# Patient Record
Sex: FEMALE | Race: WHITE | NOT HISPANIC OR LATINO | Employment: STUDENT | URBAN - METROPOLITAN AREA
[De-identification: names, ages, dates, MRNs, and addresses within clinical notes are randomized per-mention and may not be internally consistent; named-entity substitution may affect disease eponyms.]

---

## 2018-06-13 ENCOUNTER — EH NON-PROVIDER (OUTPATIENT)
Dept: OCCUPATIONAL MEDICINE | Facility: CLINIC | Age: 23
End: 2018-06-13

## 2018-06-13 ENCOUNTER — EMPLOYEE HEALTH (OUTPATIENT)
Dept: OCCUPATIONAL MEDICINE | Facility: CLINIC | Age: 23
End: 2018-06-13

## 2018-06-13 ENCOUNTER — HOSPITAL ENCOUNTER (OUTPATIENT)
Facility: MEDICAL CENTER | Age: 23
End: 2018-06-13
Attending: PREVENTIVE MEDICINE
Payer: COMMERCIAL

## 2018-06-13 VITALS
HEART RATE: 84 BPM | TEMPERATURE: 98 F | HEIGHT: 66 IN | WEIGHT: 172 LBS | BODY MASS INDEX: 27.64 KG/M2 | OXYGEN SATURATION: 98 % | DIASTOLIC BLOOD PRESSURE: 78 MMHG | RESPIRATION RATE: 16 BRPM | SYSTOLIC BLOOD PRESSURE: 116 MMHG

## 2018-06-13 DIAGNOSIS — Z02.1 PHYSICAL EXAM, PRE-EMPLOYMENT: ICD-10-CM

## 2018-06-13 DIAGNOSIS — Z02.1 ENCOUNTER FOR PRE-EMPLOYMENT HEALTH SCREENING EXAMINATION: ICD-10-CM

## 2018-06-13 DIAGNOSIS — Z02.1 PRE-EMPLOYMENT DRUG SCREENING: ICD-10-CM

## 2018-06-13 LAB
AMP AMPHETAMINE: NORMAL
BAR BARBITURATES: NORMAL
BZO BENZODIAZEPINES: NORMAL
COC COCAINE: NORMAL
INT CON NEG: NORMAL
INT CON POS: NORMAL
MDMA ECSTASY: NORMAL
MET METHAMPHETAMINES: NORMAL
MTD METHADONE: NORMAL
OPI OPIATES: NORMAL
OXY OXYCODONE: NORMAL
PCP PHENCYCLIDINE: NORMAL
POC URINE DRUG SCREEN OCDRS: NORMAL
THC: NORMAL

## 2018-06-13 PROCEDURE — 86765 RUBEOLA ANTIBODY: CPT | Performed by: PREVENTIVE MEDICINE

## 2018-06-13 PROCEDURE — 8915 PR COMPREHENSIVE PHYSICAL: Performed by: PREVENTIVE MEDICINE

## 2018-06-13 PROCEDURE — 86735 MUMPS ANTIBODY: CPT | Performed by: PREVENTIVE MEDICINE

## 2018-06-13 PROCEDURE — 90636 HEP A/HEP B VACC ADULT IM: CPT | Performed by: PREVENTIVE MEDICINE

## 2018-06-13 PROCEDURE — 86762 RUBELLA ANTIBODY: CPT | Performed by: PREVENTIVE MEDICINE

## 2018-06-13 PROCEDURE — 80305 DRUG TEST PRSMV DIR OPT OBS: CPT | Performed by: PREVENTIVE MEDICINE

## 2018-06-13 PROCEDURE — 86787 VARICELLA-ZOSTER ANTIBODY: CPT | Performed by: PREVENTIVE MEDICINE

## 2018-06-13 PROCEDURE — 86480 TB TEST CELL IMMUN MEASURE: CPT | Performed by: PREVENTIVE MEDICINE

## 2018-06-13 ASSESSMENT — VISUAL ACUITY
OS_CC: 20/25
OD_CC: 20/25

## 2018-06-13 NOTE — PROGRESS NOTES
Pre-employment physical exam. See scanned documents    Patient's body mass index is 27.76 kg/m². Exercise and nutrition counseling were performed at this visit.

## 2018-06-14 LAB — RUBV AB SER QL: >500 IU/ML

## 2018-06-15 LAB
M TB TUBERC IFN-G BLD QL: NEGATIVE
M TB TUBERC IFN-G/MITOGEN IGNF BLD: 0.01
M TB TUBERC IGNF/MITOGEN IGNF CONTROL: 41.08 [IU]/ML
MEV IGG SER IA-ACNC: 3.54
MITOGEN IGNF BCKGRD COR BLD-ACNC: 0.02 [IU]/ML
MUV IGG SER IA-ACNC: 2.01
VZV IGG SER IA-ACNC: 2.46

## 2018-06-22 ENCOUNTER — DOCUMENTATION (OUTPATIENT)
Dept: OCCUPATIONAL MEDICINE | Facility: CLINIC | Age: 23
End: 2018-06-22

## 2018-07-16 ENCOUNTER — NON-PROVIDER VISIT (OUTPATIENT)
Dept: OCCUPATIONAL MEDICINE | Facility: CLINIC | Age: 23
End: 2018-07-16

## 2018-07-16 DIAGNOSIS — Z23 NEED FOR VACCINATION: ICD-10-CM

## 2018-07-16 PROCEDURE — 90636 HEP A/HEP B VACC ADULT IM: CPT | Performed by: PREVENTIVE MEDICINE

## 2018-08-24 ENCOUNTER — OFFICE VISIT (OUTPATIENT)
Dept: MEDICAL GROUP | Age: 23
End: 2018-08-24
Payer: COMMERCIAL

## 2018-08-24 VITALS
DIASTOLIC BLOOD PRESSURE: 60 MMHG | HEART RATE: 91 BPM | HEIGHT: 66 IN | OXYGEN SATURATION: 96 % | SYSTOLIC BLOOD PRESSURE: 98 MMHG | TEMPERATURE: 98 F | BODY MASS INDEX: 27.29 KG/M2 | WEIGHT: 169.8 LBS

## 2018-08-24 DIAGNOSIS — R53.83 FATIGUE, UNSPECIFIED TYPE: ICD-10-CM

## 2018-08-24 DIAGNOSIS — E55.9 VITAMIN D DEFICIENCY: ICD-10-CM

## 2018-08-24 DIAGNOSIS — F33.1 MODERATE EPISODE OF RECURRENT MAJOR DEPRESSIVE DISORDER (HCC): ICD-10-CM

## 2018-08-24 DIAGNOSIS — J45.40 MODERATE PERSISTENT ASTHMA WITHOUT COMPLICATION: ICD-10-CM

## 2018-08-24 PROCEDURE — 99204 OFFICE O/P NEW MOD 45 MIN: CPT | Performed by: PHYSICIAN ASSISTANT

## 2018-08-24 RX ORDER — ALPRAZOLAM 0.25 MG/1
0.25 TABLET ORAL NIGHTLY PRN
COMMUNITY

## 2018-08-24 RX ORDER — DULOXETIN HYDROCHLORIDE 60 MG/1
60 CAPSULE, DELAYED RELEASE ORAL DAILY
COMMUNITY
End: 2018-10-17 | Stop reason: SDUPTHER

## 2018-08-24 RX ORDER — TRAZODONE HYDROCHLORIDE 50 MG/1
50 TABLET ORAL NIGHTLY
COMMUNITY
End: 2018-09-26

## 2018-08-24 RX ORDER — NORGESTIMATE AND ETHINYL ESTRADIOL 7DAYSX3 28
1 KIT ORAL DAILY
COMMUNITY
End: 2018-10-17 | Stop reason: SDUPTHER

## 2018-08-24 RX ORDER — MONTELUKAST SODIUM 10 MG/1
10 TABLET ORAL DAILY
COMMUNITY
End: 2018-10-17 | Stop reason: SDUPTHER

## 2018-08-24 ASSESSMENT — PATIENT HEALTH QUESTIONNAIRE - PHQ9
SUM OF ALL RESPONSES TO PHQ QUESTIONS 1-9: 14
5. POOR APPETITE OR OVEREATING: 0 - NOT AT ALL
CLINICAL INTERPRETATION OF PHQ2 SCORE: 2

## 2018-08-24 NOTE — LETTER
[facility]  Laura Hill P.A.-C.  [address] [place] [address]  Fax: [phone]   Authorization for Release/Disclosure of   Protected Health Information   Name: IMELDA GERMAIN : 1995 SSN: [id number]   Address: [id number] [place] [address]  [place] [address] Phone:    [phone] (home)    I authorize the entity listed below to release/disclose the PHI below to:   [facility]/Laura Hill P.A.-C. and Laura Hill P.A.-C.   Provider or  Dr. Tae Granger -  Psychology   Address   City, [facility], [facility]   Phone:      Fax:     Reason for request: continuity of care   Information to be released:    [  ] LAST COLONOSCOPY,  including any PATH REPORT and follow-up  [  ] LAST FIT/COLOGUARD RESULT [  ] LAST DEXA  [  ] LAST MAMMOGRAM  [  ] LAST PAP  [  ] LAST LABS [  ] RETINA EXAM REPORT  [  ] IMMUNIZATION RECORDS  [x ] Release all info      [  ] Check here and initial the line next to each item to release ALL health information INCLUDING  _____ Care and treatment for drug and / or alcohol abuse  _____ HIV testing, infection status, or AIDS  _____ Genetic Testing    DATES OF SERVICE OR TIME PERIOD TO BE DISCLOSED: _____________  I understand and acknowledge that:  * This Authorization may be revoked at any time by you in writing, except if your health information has already been used or disclosed.  * Your health information that will be used or disclosed as a result of you signing this authorization could be re-disclosed by the recipient. If this occurs, your re-disclosed health information may no longer be protected by State or Federal laws.  * You may refuse to sign this Authorization. Your refusal will not affect your ability to obtain treatment.  * This Authorization becomes effective upon signing and will  on (date) __________.      If no date is indicated, this Authorization will  one (1) year from the signature date.    Name: Imelda Germain    Signature:   Date:     2018            PLEASE FAX REQUESTED RECORDS BACK TO: [phone]

## 2018-08-24 NOTE — LETTER
Pending sale to [facility]  Laura Hill P.A.-C.  [address] [place] [address]  Fax: [phone]   Authorization for Release/Disclosure of   Protected Health Information   Name: IMELDA GERMAIN : 1995 SSN: [id number]   Address: [id number] [place] [address]  [place] [address] Phone:    [phone] (home)    I authorize the entity listed below to release/disclose the PHI below to:   Pending sale to [facility]/Laura Hill P.A.-C. and Laura Hill P.A.-C.   Provider or Entity Name:  Dr. Eulalia Ramsey - MELIZAR   Address   City, [facility], [facility]   Phone:      Fax:     Reason for request: continuity of care   Information to be released:    [  ] LAST COLONOSCOPY,  including any PATH REPORT and follow-up  [  ] LAST FIT/COLOGUARD RESULT [  ] LAST DEXA  [  ] LAST MAMMOGRAM  [  ] LAST PAP  [  ] LAST LABS [  ] RETINA EXAM REPORT  [  ] IMMUNIZATION RECORDS  [x ] Release all info      [  ] Check here and initial the line next to each item to release ALL health information INCLUDING  _____ Care and treatment for drug and / or alcohol abuse  _____ HIV testing, infection status, or AIDS  _____ Genetic Testing    DATES OF SERVICE OR TIME PERIOD TO BE DISCLOSED: _____________  I understand and acknowledge that:  * This Authorization may be revoked at any time by you in writing, except if your health information has already been used or disclosed.  * Your health information that will be used or disclosed as a result of you signing this authorization could be re-disclosed by the recipient. If this occurs, your re-disclosed health information may no longer be protected by State or Federal laws.  * You may refuse to sign this Authorization. Your refusal will not affect your ability to obtain treatment.  * This Authorization becomes effective upon signing and will  on (date) __________.      If no date is indicated, this Authorization will  one (1) year from the signature date.    Name: Imelda Germain    Signature:   Date:     2018            PLEASE FAX REQUESTED RECORDS BACK TO: [phone]

## 2018-08-24 NOTE — ASSESSMENT & PLAN NOTE
"This is a chronic problem. The patient reports that she is currently taking Cymbalta 60mg daily. She has been on this for  2.5 years. Hasn't had a lot of success with others - has tried Prozac which did not work, Wellbutrin affected her memory, Effexor caused \"zingers\" in her brain and another SSRI that did not treat her depression. She also uses Trazodone nightly as needed, but rarely takes it because it leaves her feeling groggy the next morning. She tells me that she typically uses benadryl due to quick onset and no grogginess in the morning. Xanax as needed (approximatley once a month for obsessive recurrent thoughts, does not typically have panic). This typically happens at night, which causes some insomnia and the Xanax helps put her to sleep.     Psychiatrist was Dr. Marquez who only works inpatient now. She would like a referral for a new psychiatrist.     PHQ-9 is 14 today, indicating poor control of her symptoms with the current regimen. We will check some labs and have her return in 1 month for follow up and discussion about medication adjustments. No current SI/HI or thoughts of self harm. Currently struggling with fatigue and difficulty concentrating.    "
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
## 2018-08-24 NOTE — LETTER
[facility]  Laura Hill P.A.-C.  [address] [place] [address]  Fax: [phone]   Authorization for Release/Disclosure of   Protected Health Information   Name: MICHELE JONES : 1995 SSN: [id number]   Address: [address] [place] [address]  [place] [address] Phone:    [phone] (home)    I authorize the entity listed below to release/disclose the PHI below to:   [facility]/Larua Hill P.A.-C. and Laura Hill P.A.-C.   Provider or Entity Name:  Dr. Rodriguez [facility]   Address   City, [facility] [place], [place] Phone:      Fax:     Reason for request: continuity of care   Information to be released:    [  ] LAST COLONOSCOPY,  including any PATH REPORT and follow-up  [  ] LAST FIT/COLOGUARD RESULT [  ] LAST DEXA  [  ] LAST MAMMOGRAM  [  ] LAST PAP  [  ] LAST LABS [  ] RETINA EXAM REPORT  [  ] IMMUNIZATION RECORDS  [x ] Release all info      [  ] Check here and initial the line next to each item to release ALL health information INCLUDING  _____ Care and treatment for drug and / or alcohol abuse  _____ HIV testing, infection status, or AIDS  _____ Genetic Testing    DATES OF SERVICE OR TIME PERIOD TO BE DISCLOSED: _____________  I understand and acknowledge that:  * This Authorization may be revoked at any time by you in writing, except if your health information has already been used or disclosed.  * Your health information that will be used or disclosed as a result of you signing this authorization could be re-disclosed by the recipient. If this occurs, your re-disclosed health information may no longer be protected by State or Federal laws.  * You may refuse to sign this Authorization. Your refusal will not affect your ability to obtain treatment.  * This Authorization becomes effective upon signing and will  on (date) __________.      If no date is indicated, this Authorization will  one (1) year from the signature date.    Name: Michele CABRALES  Jessa    Signature:   Date:     8/24/2018       PLEASE FAX REQUESTED RECORDS BACK TO: [phone]

## 2018-08-27 RX ORDER — BUDESONIDE AND FORMOTEROL FUMARATE DIHYDRATE 160; 4.5 UG/1; UG/1
AEROSOL RESPIRATORY (INHALATION)
Refills: 2 | COMMUNITY
Start: 2018-06-27 | End: 2018-10-17 | Stop reason: SDUPTHER
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
## 2018-09-12 ENCOUNTER — HOSPITAL ENCOUNTER (OUTPATIENT)
Dept: LAB | Facility: MEDICAL CENTER | Age: 23
End: 2018-09-12
Payer: COMMERCIAL

## 2018-09-12 ENCOUNTER — HOSPITAL ENCOUNTER (OUTPATIENT)
Dept: LAB | Facility: MEDICAL CENTER | Age: 23
End: 2018-09-12
Attending: PHYSICIAN ASSISTANT
Payer: COMMERCIAL

## 2018-09-12 DIAGNOSIS — R53.83 FATIGUE, UNSPECIFIED TYPE: ICD-10-CM

## 2018-09-12 DIAGNOSIS — E55.9 VITAMIN D DEFICIENCY: ICD-10-CM

## 2018-09-12 LAB
25(OH)D3 SERPL-MCNC: 31 NG/ML (ref 30–100)
ALBUMIN SERPL BCP-MCNC: 4.4 G/DL (ref 3.2–4.9)
ALBUMIN/GLOB SERPL: 1.5 G/DL
ALP SERPL-CCNC: 41 U/L (ref 30–99)
ALT SERPL-CCNC: 12 U/L (ref 2–50)
ANION GAP SERPL CALC-SCNC: 10 MMOL/L (ref 0–11.9)
AST SERPL-CCNC: 16 U/L (ref 12–45)
BASOPHILS # BLD AUTO: 0.8 % (ref 0–1.8)
BASOPHILS # BLD: 0.04 K/UL (ref 0–0.12)
BDY FAT % MEASURED: 30.5 %
BILIRUB SERPL-MCNC: 0.6 MG/DL (ref 0.1–1.5)
BP DIAS: 67 MMHG
BP SYS: 112 MMHG
BUN SERPL-MCNC: 13 MG/DL (ref 8–22)
CALCIUM SERPL-MCNC: 9.6 MG/DL (ref 8.5–10.5)
CHLORIDE SERPL-SCNC: 106 MMOL/L (ref 96–112)
CHOLEST SERPL-MCNC: 203 MG/DL (ref 100–199)
CO2 SERPL-SCNC: 24 MMOL/L (ref 20–33)
CREAT SERPL-MCNC: 0.8 MG/DL (ref 0.5–1.4)
DIABETES HTDIA: NO
EOSINOPHIL # BLD AUTO: 0.55 K/UL (ref 0–0.51)
EOSINOPHIL NFR BLD: 11.1 % (ref 0–6.9)
ERYTHROCYTE [DISTWIDTH] IN BLOOD BY AUTOMATED COUNT: 36.2 FL (ref 35.9–50)
EVENT NAME HTEVT: NORMAL
FASTING HTFAS: YES
FASTING STATUS PATIENT QL REPORTED: NORMAL
FASTING STATUS PATIENT QL REPORTED: NORMAL
GLOBULIN SER CALC-MCNC: 3 G/DL (ref 1.9–3.5)
GLUCOSE SERPL-MCNC: 70 MG/DL (ref 65–99)
GLUCOSE SERPL-MCNC: 71 MG/DL (ref 65–99)
HCT VFR BLD AUTO: 45.4 % (ref 37–47)
HDLC SERPL-MCNC: 64 MG/DL
HGB BLD-MCNC: 15.2 G/DL (ref 12–16)
HYPERTENSION HTHYP: NO
IMM GRANULOCYTES # BLD AUTO: 0.01 K/UL (ref 0–0.11)
IMM GRANULOCYTES NFR BLD AUTO: 0.2 % (ref 0–0.9)
LDLC SERPL CALC-MCNC: 127 MG/DL
LYMPHOCYTES # BLD AUTO: 1.45 K/UL (ref 1–4.8)
LYMPHOCYTES NFR BLD: 29.2 % (ref 22–41)
MCH RBC QN AUTO: 27.3 PG (ref 27–33)
MCHC RBC AUTO-ENTMCNC: 33.5 G/DL (ref 33.6–35)
MCV RBC AUTO: 81.7 FL (ref 81.4–97.8)
MONOCYTES # BLD AUTO: 0.27 K/UL (ref 0–0.85)
MONOCYTES NFR BLD AUTO: 5.4 % (ref 0–13.4)
NEUTROPHILS # BLD AUTO: 2.65 K/UL (ref 2–7.15)
NEUTROPHILS NFR BLD: 53.3 % (ref 44–72)
NRBC # BLD AUTO: 0 K/UL
NRBC BLD-RTO: 0 /100 WBC
PLATELET # BLD AUTO: 244 K/UL (ref 164–446)
PMV BLD AUTO: 11.2 FL (ref 9–12.9)
POTASSIUM SERPL-SCNC: 4.3 MMOL/L (ref 3.6–5.5)
PROT SERPL-MCNC: 7.4 G/DL (ref 6–8.2)
RBC # BLD AUTO: 5.56 M/UL (ref 4.2–5.4)
SCREENING LOC CITY HTCIT: NORMAL
SCREENING LOC STATE HTSTA: NORMAL
SCREENING LOCATION HTLOC: NORMAL
SMOKING HTSMO: NO
SODIUM SERPL-SCNC: 140 MMOL/L (ref 135–145)
SUBSCRIBER ID HTSID: NORMAL
TRIGL SERPL-MCNC: 59 MG/DL (ref 0–149)
TSH SERPL DL<=0.005 MIU/L-ACNC: 1.81 UIU/ML (ref 0.38–5.33)
VIT B12 SERPL-MCNC: 441 PG/ML (ref 211–911)
WBC # BLD AUTO: 5 K/UL (ref 4.8–10.8)

## 2018-09-12 PROCEDURE — 82947 ASSAY GLUCOSE BLOOD QUANT: CPT

## 2018-09-12 PROCEDURE — 36415 COLL VENOUS BLD VENIPUNCTURE: CPT

## 2018-09-12 PROCEDURE — 80061 LIPID PANEL: CPT

## 2018-09-12 PROCEDURE — S5190 WELLNESS ASSESSMENT BY NONPH: HCPCS

## 2018-09-24 PROCEDURE — 90471 IMMUNIZATION ADMIN: CPT | Performed by: PREVENTIVE MEDICINE

## 2018-09-24 PROCEDURE — 90686 IIV4 VACC NO PRSV 0.5 ML IM: CPT | Performed by: PREVENTIVE MEDICINE

## 2018-09-26 ENCOUNTER — HOSPITAL ENCOUNTER (OUTPATIENT)
Facility: MEDICAL CENTER | Age: 23
End: 2018-09-26
Attending: PHYSICIAN ASSISTANT
Payer: COMMERCIAL

## 2018-09-26 ENCOUNTER — OFFICE VISIT (OUTPATIENT)
Dept: MEDICAL GROUP | Age: 23
End: 2018-09-26
Payer: COMMERCIAL

## 2018-09-26 VITALS
WEIGHT: 170.4 LBS | BODY MASS INDEX: 27.38 KG/M2 | HEIGHT: 66 IN | SYSTOLIC BLOOD PRESSURE: 110 MMHG | DIASTOLIC BLOOD PRESSURE: 62 MMHG | HEART RATE: 86 BPM | OXYGEN SATURATION: 97 % | TEMPERATURE: 97.2 F

## 2018-09-26 DIAGNOSIS — F33.1 MODERATE EPISODE OF RECURRENT MAJOR DEPRESSIVE DISORDER (HCC): ICD-10-CM

## 2018-09-26 DIAGNOSIS — Z01.419 WELL FEMALE EXAM WITH ROUTINE GYNECOLOGICAL EXAM: ICD-10-CM

## 2018-09-26 DIAGNOSIS — Z12.4 PAP SMEAR FOR CERVICAL CANCER SCREENING: ICD-10-CM

## 2018-09-26 PROCEDURE — 87491 CHLMYD TRACH DNA AMP PROBE: CPT

## 2018-09-26 PROCEDURE — 88175 CYTOPATH C/V AUTO FLUID REDO: CPT

## 2018-09-26 PROCEDURE — 87591 N.GONORRHOEAE DNA AMP PROB: CPT

## 2018-09-26 PROCEDURE — 99395 PREV VISIT EST AGE 18-39: CPT | Performed by: PHYSICIAN ASSISTANT

## 2018-09-26 PROCEDURE — 99000 SPECIMEN HANDLING OFFICE-LAB: CPT | Performed by: PHYSICIAN ASSISTANT

## 2018-09-26 RX ORDER — BUPROPION HYDROCHLORIDE 75 MG/1
75 TABLET ORAL DAILY
Qty: 30 TAB | Refills: 0 | Status: SHIPPED | OUTPATIENT
Start: 2018-09-26 | End: 2018-10-17 | Stop reason: SDUPTHER

## 2018-09-26 NOTE — PROGRESS NOTES
"S: Imelda Germain is a 22 y.o.,female who presents today for her Pap and GYN exam. Her LMP was 2018. She is still having regular menses. Her form of contraception is oral contraceptives    Moderate episode of recurrent major depressive disorder (HCC)  This is an ongoing problem.  Since our initial visit, the patient tells me that her symptoms are about the same.  At her last visit her PHQ was 14 and her chief complaint has been fatigue.  We did some routine labs and her vitamin D, B12, and thyroid tests were all normal.  Based on this, she is interested in making some adjustments to her depression medication, she feels that this is likely the cause of her symptoms.  She denies any suicidal or homicidal ideation today.  In the past, she has worked with a psychiatrist through [facility] but has not been able to see him as he moved to inpatient care only.  I did refer her to our psychiatry and behavioral health group, but she has not yet been able to get scheduled with them.    In the past, she has tried many medications which have not worked well for her.  She did use Wellbutrin in the past with some good response with her depression.  However, she tells me that she had a \"mental fog\" which made it difficult for her to focus on this medication.  She is unsure of the dose, but thinks it was maybe 150 mg daily.  We discussed several options including increasing her Cymbalta to 90 mg daily, or adding a low dose of Wellbutrin.  She is interested in adding a low-dose of Wellbutrin to see if we can augment her control.      We discussed the signs and symptoms of serotonin syndrome, and she will watch for these.  However, she is no longer taking trazodone to help with sleep so this will decrease her risk significantly.  I also provided her with the contact information for behavioral health department so she may call to schedule her counseling and psychiatry appointments.  We will recheck in 3 weeks.      She is , " "P:0.    She has not had an Abnormal Pap previously. This is her first Pap.   Her last Mammogram was done: due at 39 yo.     Her preventative health screens are up to date.  GYN ROS:  normal menses, no abnormal bleeding, pelvic pain or discharge, no breast pain or new or enlarging lumps on self exam    Patient Active Problem List    Diagnosis Date Noted   • Moderate episode of recurrent major depressive disorder (HCC) 08/24/2018   • Moderate persistent asthma without complication 08/24/2018   • Bronchospasm 05/16/2011     Current Outpatient Prescriptions on File Prior to Visit   Medication Sig Dispense Refill   • SYMBICORT 160-4.5 MCG/ACT Aerosol INL 1 PUFF PO BID  2   • montelukast (SINGULAIR) 10 MG Tab Take 10 mg by mouth every day.     • Cetirizine HCl (ZYRTEC ALLERGY) 10 MG Cap Take  by mouth.     • Fluticasone Propionate (FLONASE NA) Spray  in nose.     • DiphenhydrAMINE HCl (BENADRYL ALLERGY PO) Take  by mouth.     • Norgestim-Eth Estrad Triphasic (TRINESSA, 28,) 0.18/0.215/0.25 MG-35 MCG Tab Take 1 Tab by mouth every day.     • ALPRAZolam (XANAX) 0.25 MG Tab Take 0.25 mg by mouth at bedtime as needed for Sleep.     • DULoxetine (CYMBALTA) 60 MG Cap DR Particles delayed-release capsule Take 60 mg by mouth every day.     • albuterol (PROAIR HFA) 108 (90 BASE) MCG/ACT AERS Inhale 2 Puffs by mouth every 6 hours as needed for Shortness of Breath. 1 Inhaler 3   • L-Lysine 500 MG CAPS Take 1 Cap by mouth 2 Times a Day. 30 Each 0     No current facility-administered medications on file prior to visit.      Social History   Substance Use Topics   • Smoking status: Never Smoker   • Smokeless tobacco: Never Used   • Alcohol use No       O: /62 (BP Location: Left arm, Patient Position: Sitting, BP Cuff Size: Adult)   Pulse 86   Temp 36.2 °C (97.2 °F) (Temporal)   Ht 1.676 m (5' 6\")   Wt 77.3 kg (170 lb 6.4 oz)   LMP 09/15/2018   SpO2 97%   BMI 27.50 kg/m²   Vitals Noted and Reviewed  Vulva: grossly " unremarkable, no lesions or masses noted  Vagina: no abnormal discharge, normal color  Cervix: Parous, nonfriable, no surface lesions identified, Pap was performed  Uterus: Normal shape, position and consistency, Retroverted, mobile  Bimanual exam: No uteromegaly, negative chandelier sign, adnexa freely movable and without enlargements bilaterally  Rectal: not performed    Assessment:  Normal GYN Exam      Plan:   pap smear  return annually or prn    Pap processed and sent to the lab.    Recommend follow up in 3 weeks for follow up on medications.

## 2018-09-26 NOTE — ASSESSMENT & PLAN NOTE
"This is an ongoing problem.  Since our initial visit, the patient tells me that her symptoms are about the same.  At her last visit her PHQ was 14 and her chief complaint has been fatigue.  We did some routine labs and her vitamin D, B12, and thyroid tests were all normal.  Based on this, she is interested in making some adjustments to her depression medication, she feels that this is likely the cause of her symptoms.  She denies any suicidal or homicidal ideation today.  In the past, she has worked with a psychiatrist through [facility] but has not been able to see him as he moved to inpatient care only.  I did refer her to our psychiatry and behavioral health group, but she has not yet been able to get scheduled with them.    In the past, she has tried many medications which have not worked well for her.  She did use Wellbutrin in the past with some good response with her depression.  However, she tells me that she had a \"mental fog\" which made it difficult for her to focus on this medication.  She is unsure of the dose, but thinks it was maybe 150 mg daily.  We discussed several options including increasing her Cymbalta to 90 mg daily, or adding a low dose of Wellbutrin.  She is interested in adding a low-dose of Wellbutrin to see if we can augment her control.      We discussed the signs and symptoms of serotonin syndrome, and she will watch for these.  However, she is no longer taking trazodone to help with sleep so this will decrease her risk significantly.  I also provided her with the contact information for behavioral health department so she may call to schedule her counseling and psychiatry appointments.  We will recheck in 3 weeks.  "

## 2018-09-27 DIAGNOSIS — Z01.419 WELL FEMALE EXAM WITH ROUTINE GYNECOLOGICAL EXAM: ICD-10-CM

## 2018-09-27 DIAGNOSIS — Z12.4 PAP SMEAR FOR CERVICAL CANCER SCREENING: ICD-10-CM

## 2018-09-27 LAB — CYTOLOGY REG CYTOL: NORMAL

## 2018-09-28 LAB
C TRACH DNA GENITAL QL NAA+PROBE: NEGATIVE
N GONORRHOEA DNA GENITAL QL NAA+PROBE: NEGATIVE
SPECIMEN SOURCE: NORMAL

## 2018-10-01 ENCOUNTER — IMMUNIZATION (OUTPATIENT)
Dept: OCCUPATIONAL MEDICINE | Facility: CLINIC | Age: 23
End: 2018-10-01
Payer: COMMERCIAL

## 2018-10-01 DIAGNOSIS — B37.31 VAGINAL CANDIDA: ICD-10-CM

## 2018-10-01 DIAGNOSIS — Z23 NEED FOR VACCINATION: ICD-10-CM

## 2018-10-01 RX ORDER — FLUCONAZOLE 150 MG/1
150 TABLET ORAL ONCE
Qty: 2 TAB | Refills: 0 | Status: SHIPPED | OUTPATIENT
Start: 2018-10-01 | End: 2018-10-01

## 2018-10-17 ENCOUNTER — OFFICE VISIT (OUTPATIENT)
Dept: MEDICAL GROUP | Age: 23
End: 2018-10-17
Payer: COMMERCIAL

## 2018-10-17 VITALS
BODY MASS INDEX: 27.26 KG/M2 | SYSTOLIC BLOOD PRESSURE: 90 MMHG | WEIGHT: 169.6 LBS | TEMPERATURE: 97.4 F | OXYGEN SATURATION: 98 % | HEIGHT: 66 IN | HEART RATE: 71 BPM | DIASTOLIC BLOOD PRESSURE: 64 MMHG

## 2018-10-17 DIAGNOSIS — F33.1 MODERATE EPISODE OF RECURRENT MAJOR DEPRESSIVE DISORDER (HCC): ICD-10-CM

## 2018-10-17 DIAGNOSIS — Z23 NEED FOR VACCINATION: ICD-10-CM

## 2018-10-17 DIAGNOSIS — Z30.41 ENCOUNTER FOR SURVEILLANCE OF CONTRACEPTIVE PILLS: ICD-10-CM

## 2018-10-17 DIAGNOSIS — J45.40 MODERATE PERSISTENT ASTHMA WITHOUT COMPLICATION: ICD-10-CM

## 2018-10-17 PROCEDURE — 90472 IMMUNIZATION ADMIN EACH ADD: CPT | Performed by: PHYSICIAN ASSISTANT

## 2018-10-17 PROCEDURE — 90732 PPSV23 VACC 2 YRS+ SUBQ/IM: CPT | Performed by: PHYSICIAN ASSISTANT

## 2018-10-17 PROCEDURE — 90471 IMMUNIZATION ADMIN: CPT | Performed by: PHYSICIAN ASSISTANT

## 2018-10-17 PROCEDURE — 90651 9VHPV VACCINE 2/3 DOSE IM: CPT | Performed by: PHYSICIAN ASSISTANT

## 2018-10-17 PROCEDURE — 99213 OFFICE O/P EST LOW 20 MIN: CPT | Mod: 25 | Performed by: PHYSICIAN ASSISTANT

## 2018-10-17 RX ORDER — MONTELUKAST SODIUM 10 MG/1
10 TABLET ORAL DAILY
Qty: 90 TAB | Refills: 3 | Status: SHIPPED | OUTPATIENT
Start: 2018-10-17 | End: 2019-07-24 | Stop reason: SDUPTHER

## 2018-10-17 RX ORDER — ALBUTEROL SULFATE 90 UG/1
2 AEROSOL, METERED RESPIRATORY (INHALATION) EVERY 6 HOURS PRN
Qty: 1 INHALER | Refills: 3 | Status: SHIPPED | OUTPATIENT
Start: 2018-10-17

## 2018-10-17 RX ORDER — DULOXETIN HYDROCHLORIDE 60 MG/1
60 CAPSULE, DELAYED RELEASE ORAL DAILY
Qty: 90 CAP | Refills: 3 | Status: SHIPPED | OUTPATIENT
Start: 2018-10-17 | End: 2019-07-24

## 2018-10-17 RX ORDER — BUDESONIDE AND FORMOTEROL FUMARATE DIHYDRATE 160; 4.5 UG/1; UG/1
1 AEROSOL RESPIRATORY (INHALATION) 2 TIMES DAILY
Qty: 3 INHALER | Refills: 3 | Status: SHIPPED | OUTPATIENT
Start: 2018-10-17 | End: 2019-07-24 | Stop reason: SDUPTHER

## 2018-10-17 RX ORDER — BUPROPION HYDROCHLORIDE 75 MG/1
75 TABLET ORAL DAILY
Qty: 90 TAB | Refills: 3 | Status: SHIPPED | OUTPATIENT
Start: 2018-10-17 | End: 2019-07-24

## 2018-10-17 RX ORDER — NORGESTIMATE AND ETHINYL ESTRADIOL 7DAYSX3 28
1 KIT ORAL DAILY
Qty: 84 TAB | Refills: 3 | Status: SHIPPED | OUTPATIENT
Start: 2018-10-17 | End: 2019-03-04

## 2018-10-17 NOTE — ASSESSMENT & PLAN NOTE
"Ongoing problem. She reports improvement in her symptoms with the addition of the Wellbutrin and without the \"mind fog\" that she previously had with this medication at higher doses, or any other side effects. She would like to continue with this combination.     Improvement in her motivation and overall mood. No suicidal or homicidal ideation.   "

## 2018-10-17 NOTE — PROGRESS NOTES
"CC: depression medication follow up.    History of Present Illness: This is a 22 y.o. female established patient who presents today for follow up after recent medication change.     Moderate episode of recurrent major depressive disorder (HCC)  Ongoing problem. She reports improvement in her symptoms with the addition of the Wellbutrin and without the \"mind fog\" that she previously had with this medication at higher doses, or any other side effects. She would like to continue with this combination.     Improvement in her motivation and overall mood. No suicidal or homicidal ideation.       Patient Active Problem List    Diagnosis Date Noted   • Moderate episode of recurrent major depressive disorder (HCC) 08/24/2018   • Moderate persistent asthma without complication 08/24/2018      Allergies:Amoxicillin and Erythrocin    Current Outpatient Prescriptions   Medication Sig Dispense Refill   • buPROPion (WELLBUTRIN) 75 MG Tab Take 1 Tab by mouth every day. 90 Tab 3   • SYMBICORT 160-4.5 MCG/ACT Aerosol Inhale 1 Puff by mouth 2 Times a Day. 3 Inhaler 3   • montelukast (SINGULAIR) 10 MG Tab Take 1 Tab by mouth every day. 90 Tab 3   • Norgestim-Eth Estrad Triphasic (TRINESSA, 28,) 0.18/0.215/0.25 MG-35 MCG Tab Take 1 Tab by mouth every day. 84 Tab 3   • DULoxetine (CYMBALTA) 60 MG Cap DR Particles delayed-release capsule Take 1 Cap by mouth every day. 90 Cap 3   • albuterol (PROAIR HFA) 108 (90 Base) MCG/ACT Aero Soln inhalation aerosol Inhale 2 Puffs by mouth every 6 hours as needed for Shortness of Breath. 1 Inhaler 3   • Cetirizine HCl (ZYRTEC ALLERGY) 10 MG Cap Take  by mouth.     • Fluticasone Propionate (FLONASE NA) Spray  in nose.     • DiphenhydrAMINE HCl (BENADRYL ALLERGY PO) Take  by mouth.     • ALPRAZolam (XANAX) 0.25 MG Tab Take 0.25 mg by mouth at bedtime as needed for Sleep.     • L-Lysine 500 MG CAPS Take 1 Cap by mouth 2 Times a Day. 30 Each 0     No current facility-administered medications for this " "visit.        Social History   Substance Use Topics   • Smoking status: Never Smoker   • Smokeless tobacco: Never Used   • Alcohol use No     Social History     Social History Narrative   • No narrative on file       No family history on file.    Review of Systems:    Constitutional: Negative for fever, chills.   Gastrointestinal: Negative for abdominal pain, nausea or vomiting.  Musculoskeletal: Negative for myalgias, back pain, and joint pain.   Skin: Negative for rash, itching, cyanotic skin color change.   Neurological: Negative for dizziness, tingling, tremors, focal sensory deficit, focal weakness and headaches.   Psychiatric/Behavioral: Negative for depression, suicidal/homicidal ideation and memory loss.            Exam:    Blood pressure (!) 90/64, pulse 71, temperature 36.3 °C (97.4 °F), temperature source Temporal, height 1.676 m (5' 6\"), weight 76.9 kg (169 lb 9.6 oz), SpO2 98 %, not currently breastfeeding. Body mass index is 27.37 kg/m².    General: Normal appearing. No distress.  Eyes: Conjunctiva clear, lids without ptosis, pupils equal, round and reactive to light  Musculoskeletal: Normal gait. No extremity cyanosis, clubbing, or edema.  Neurologic: Grossly non-focal, DTRs intact.  Skin: Warm and dry.  No obvious lesions.  Psych: Normal mood and affect. Alert and oriented x3. Judgment and insight is normal.      Health Maintenance: Completed    Assessment/Plan:  1. Moderate episode of recurrent major depressive disorder (HCC)  Controlled with current medications and lifestyle measures. No changes are indicated at this time.   - buPROPion (WELLBUTRIN) 75 MG Tab; Take 1 Tab by mouth every day.  Dispense: 90 Tab; Refill: 3  - DULoxetine (CYMBALTA) 60 MG Cap DR Particles delayed-release capsule; Take 1 Cap by mouth every day.  Dispense: 90 Cap; Refill: 3    2. Moderate persistent asthma without complication  Controlled with current medications and lifestyle measures. No changes are indicated at this " time.   - SYMBICORT 160-4.5 MCG/ACT Aerosol; Inhale 1 Puff by mouth 2 Times a Day.  Dispense: 3 Inhaler; Refill: 3  - montelukast (SINGULAIR) 10 MG Tab; Take 1 Tab by mouth every day.  Dispense: 90 Tab; Refill: 3  - albuterol (PROAIR HFA) 108 (90 Base) MCG/ACT Aero Soln inhalation aerosol; Inhale 2 Puffs by mouth every 6 hours as needed for Shortness of Breath.  Dispense: 1 Inhaler; Refill: 3  - PneumoVax PPV23 =>1yo    3. Encounter for surveillance of contraceptive pills  - Norgestim-Eth Estrad Triphasic (TRINESSA, 28,) 0.18/0.215/0.25 MG-35 MCG Tab; Take 1 Tab by mouth every day.  Dispense: 84 Tab; Refill: 3    4. Need for vaccination  - Gardasil 9      Return in about 2 months (around 12/17/2018) for MA repeat Gardasil.    Patient was in agreement with this treatment plan and seemed to understand without barriers. All questions were encouraged and answered. Reviewed signs and symptoms of when to seek emergency medical care.     Please note that this dictation was created using voice recognition software. I have made every reasonable attempt to correct obvious errors, but I expect that there may be errors of grammar and possibly content that I did not discover before finalizing the note.

## 2018-10-22 ENCOUNTER — OFFICE VISIT (OUTPATIENT)
Dept: MEDICAL GROUP | Age: 23
End: 2018-10-22
Payer: COMMERCIAL

## 2018-10-22 VITALS
WEIGHT: 169 LBS | DIASTOLIC BLOOD PRESSURE: 66 MMHG | OXYGEN SATURATION: 96 % | BODY MASS INDEX: 27.16 KG/M2 | HEIGHT: 66 IN | HEART RATE: 84 BPM | TEMPERATURE: 97.6 F | SYSTOLIC BLOOD PRESSURE: 112 MMHG

## 2018-10-22 DIAGNOSIS — J45.40 MODERATE PERSISTENT ASTHMA WITHOUT COMPLICATION: ICD-10-CM

## 2018-10-22 DIAGNOSIS — R09.89 CHEST CONGESTION: ICD-10-CM

## 2018-10-22 PROCEDURE — 99214 OFFICE O/P EST MOD 30 MIN: CPT | Performed by: PHYSICIAN ASSISTANT

## 2018-10-22 RX ORDER — IPRATROPIUM BROMIDE AND ALBUTEROL SULFATE 2.5; .5 MG/3ML; MG/3ML
3 SOLUTION RESPIRATORY (INHALATION) ONCE
Status: COMPLETED | OUTPATIENT
Start: 2018-10-22 | End: 2018-10-22

## 2018-10-22 RX ORDER — ALBUTEROL SULFATE 2.5 MG/3ML
2.5 SOLUTION RESPIRATORY (INHALATION) EVERY 4 HOURS PRN
Qty: 30 BULLET | Refills: 1 | Status: SHIPPED | OUTPATIENT
Start: 2018-10-22

## 2018-10-22 RX ADMIN — IPRATROPIUM BROMIDE AND ALBUTEROL SULFATE 3 ML: 2.5; .5 SOLUTION RESPIRATORY (INHALATION) at 11:30

## 2018-10-22 ASSESSMENT — PAIN SCALES - GENERAL: PAINLEVEL: NO PAIN

## 2018-10-22 NOTE — ASSESSMENT & PLAN NOTE
This is a new problem that began last night at work.  She noticed acute onset of chest congestion and the feeling like she needed to cough.  However, when she did try to cough it was nonproductive and felt like it was not fixing the problem.  At home, she tried a steam shower, a topical essential oil like Vicks, and her Ventolin.  Unfortunately, none of these resolved her symptoms.  She denies dyspnea, but feels uncomfortable and feels the need to cough all the time, non productive.  She tells me that this kept her awake at night last night, due to the sensation of needing to cough.     No fevers or chills. No headaches.  She does note a sore throat, but thinks this is due to the frequent cough.  She denies history of frequent asthma exacerbations, and has never needed steroids in the past.

## 2018-10-22 NOTE — PROGRESS NOTES
CC: Cough and chest congestion    History of Present Illness: This is a 22 y.o. female established patient who presents today for new onset of chest congestion times 1 day.    Chest congestion  This is a new problem that began last night at work.  She noticed acute onset of chest congestion and the feeling like she needed to cough.  However, when she did try to cough it was nonproductive and felt like it was not fixing the problem.  At home, she tried a steam shower, a topical essential oil like Vicks, and her Ventolin.  Unfortunately, none of these resolved her symptoms.  She denies dyspnea, but feels uncomfortable and feels the need to cough all the time, non productive.  She tells me that this kept her awake at night last night, due to the sensation of needing to cough.     No fevers or chills. No headaches.  She does note a sore throat, but thinks this is due to the frequent cough.  She denies history of frequent asthma exacerbations, and has never needed steroids in the past.      Patient Active Problem List    Diagnosis Date Noted   • Chest congestion 10/22/2018   • Moderate episode of recurrent major depressive disorder (HCC) 08/24/2018   • Moderate persistent asthma without complication 08/24/2018      Allergies:Amoxicillin and Erythrocin    Current Outpatient Prescriptions   Medication Sig Dispense Refill   • albuterol (PROVENTIL) 2.5mg/3ml Nebu Soln solution for nebulization 3 mL by Nebulization route every four hours as needed for Shortness of Breath. 30 Bullet 1   • buPROPion (WELLBUTRIN) 75 MG Tab Take 1 Tab by mouth every day. 90 Tab 3   • SYMBICORT 160-4.5 MCG/ACT Aerosol Inhale 1 Puff by mouth 2 Times a Day. 3 Inhaler 3   • montelukast (SINGULAIR) 10 MG Tab Take 1 Tab by mouth every day. 90 Tab 3   • Norgestim-Eth Estrad Triphasic (TRINESSA, 28,) 0.18/0.215/0.25 MG-35 MCG Tab Take 1 Tab by mouth every day. 84 Tab 3   • DULoxetine (CYMBALTA) 60 MG Cap DR Particles delayed-release capsule Take 1 Cap by  "mouth every day. 90 Cap 3   • albuterol (PROAIR HFA) 108 (90 Base) MCG/ACT Aero Soln inhalation aerosol Inhale 2 Puffs by mouth every 6 hours as needed for Shortness of Breath. 1 Inhaler 3   • Cetirizine HCl (ZYRTEC ALLERGY) 10 MG Cap Take  by mouth.     • Fluticasone Propionate (FLONASE NA) Spray  in nose.     • DiphenhydrAMINE HCl (BENADRYL ALLERGY PO) Take  by mouth.     • ALPRAZolam (XANAX) 0.25 MG Tab Take 0.25 mg by mouth at bedtime as needed for Sleep.     • L-Lysine 500 MG CAPS Take 1 Cap by mouth 2 Times a Day. 30 Each 0     No current facility-administered medications for this visit.        Social History   Substance Use Topics   • Smoking status: Never Smoker   • Smokeless tobacco: Never Used   • Alcohol use No     Social History     Social History Narrative   • No narrative on file       No family history on file.    Review of Systems:    Constitutional: Negative for fever, chills.   Eyes: Negative for pain with EOMs, blurry or double vision or photophobia.  EENT: Positive for sore throat.  Positive for cough, chest congestion.  Negative for headaches, vision changes, hearing changes, ear pain, ear discharge, rhinorrhea, sinus congestion, sore throat, and neck pain.   Respiratory: Positive for cough, chest congestion.  Negative for sputum production, dyspnea, wheezing, and crackles.   Cardiovascular: Negative for chest pain, palpitations.   Musculoskeletal: Negative for myalgias.   Skin: Negative for rash, itching, cyanotic skin color change.   Psychiatric/Behavioral: Negative for depression, suicidal/homicidal ideation and memory loss.            Exam:    Blood pressure 112/66, pulse 84, temperature 36.4 °C (97.6 °F), temperature source Temporal, height 1.676 m (5' 6\"), weight 76.7 kg (169 lb), last menstrual period 10/06/2018, SpO2 96 %, not currently breastfeeding. Body mass index is 27.28 kg/m².    General: Normal appearing. No distress.  Eyes: Oropharynx is with erythema, but without edema or " exudates.  Conjunctiva clear, lids without ptosis, pupils equal, round and reactive to light  ENT: Oropharynx is without edema or exudates, but with erythema.  Ears normal shape and contour, canals are clear bilaterally, tympanic membranes are benign, nasal mucosa benign.   Neck: Supple without JVD or bruit. Thyroid is not enlarged.  Lymph: No cervical, supra- or infraclavicular lymphadenopathy.  Pulmonary: Slight scattered wheezes throughout.  Normal effort. Clear to ausculation in all fields. No rales or ronchi.  Cardiovascular: Regular rate and rhythm without murmurs, rubs or gallops.  Psych: Normal mood and affect. Alert and oriented x3. Judgment and insight is normal.      Health Maintenance: Completed    Assessment/Plan:  1. Chest congestion  Improved after administration of DuoNeb treatment in the office.  In the absence of other symptoms, I do not think additional treatment such as steroids or antibiotics are warranted at this time.  We discussed signs and symptoms for recheck including fevers, chills, worsening dyspnea or wheezes as a reason to follow-up.  Otherwise, she will use her home albuterol nebulizer which I have refilled today.  - ipratropium-albuterol (DUONEB) nebulizer solution; 3 mL by Nebulization route Once.  - albuterol (PROVENTIL) 2.5mg/3ml Nebu Soln solution for nebulization; 3 mL by Nebulization route every 34 hours as needed for Shortness of Breath., Disp-30 Bullet, R-1, Normal      No Follow-up on file.    Patient was in agreement with this treatment plan and seemed to understand without barriers. All questions were encouraged and answered. Reviewed signs and symptoms of when to seek emergency medical care.     Please note that this dictation was created using voice recognition software. I have made every reasonable attempt to correct obvious errors, but I expect that there may be errors of grammar and possibly content that I did not discover before finalizing the note.

## 2018-11-26 ENCOUNTER — APPOINTMENT (OUTPATIENT)
Dept: OCCUPATIONAL MEDICINE | Facility: CLINIC | Age: 23
End: 2018-11-26

## 2018-12-17 ENCOUNTER — APPOINTMENT (OUTPATIENT)
Dept: MEDICAL GROUP | Age: 23
End: 2018-12-17
Payer: COMMERCIAL

## 2018-12-21 ENCOUNTER — TELEPHONE (OUTPATIENT)
Dept: MEDICAL GROUP | Age: 23
End: 2018-12-21

## 2018-12-21 ENCOUNTER — NON-PROVIDER VISIT (OUTPATIENT)
Dept: MEDICAL GROUP | Age: 23
End: 2018-12-21
Payer: COMMERCIAL

## 2018-12-21 DIAGNOSIS — Z23 NEED FOR VACCINATION: ICD-10-CM

## 2018-12-21 DIAGNOSIS — Z23 NEED FOR HPV VACCINATION: ICD-10-CM

## 2018-12-21 PROCEDURE — 90651 9VHPV VACCINE 2/3 DOSE IM: CPT | Performed by: FAMILY MEDICINE

## 2018-12-21 PROCEDURE — 90471 IMMUNIZATION ADMIN: CPT | Performed by: FAMILY MEDICINE

## 2018-12-21 NOTE — NON-PROVIDER
"Imelda Germain is a 23 y.o. female here for a non-provider visit for:   HPV 2 of 3    Reason for immunization: continue or complete series started at the office  Immunization records indicate need for vaccine: Yes, confirmed with Epic  Minimum interval has been met for this vaccine: Yes  ABN completed: No    Order and dose verified by: BERNARDINO COREAS Dated  12/2/16 was given to patient: Yes  All IAC Questionnaire questions were answered \"No.\"    Patient tolerated injection and no adverse effects were observed or reported: Yes    Pt scheduled for next dose in series: No  "

## 2018-12-21 NOTE — TELEPHONE ENCOUNTER
Patient is on the MA Schedule today for HPV vaccine/injection.    SPECIFIC Action To Be Taken: Orders pending, please sign.

## 2018-12-31 ENCOUNTER — NON-PROVIDER VISIT (OUTPATIENT)
Dept: OCCUPATIONAL MEDICINE | Facility: CLINIC | Age: 23
End: 2018-12-31

## 2018-12-31 DIAGNOSIS — Z23 NEED FOR VACCINATION WITH TWINRIX: ICD-10-CM

## 2018-12-31 PROCEDURE — 90636 HEP A/HEP B VACC ADULT IM: CPT | Performed by: INTERNAL MEDICINE

## 2019-01-21 ENCOUNTER — OFFICE VISIT (OUTPATIENT)
Dept: BEHAVIORAL HEALTH | Facility: CLINIC | Age: 24
End: 2019-01-21
Payer: COMMERCIAL

## 2019-01-21 DIAGNOSIS — F32.89 OTHER DEPRESSION: ICD-10-CM

## 2019-01-21 DIAGNOSIS — F41.9 ANXIETY: ICD-10-CM

## 2019-01-21 PROCEDURE — 90791 PSYCH DIAGNOSTIC EVALUATION: CPT | Performed by: SOCIAL WORKER

## 2019-01-21 ASSESSMENT — PATIENT HEALTH QUESTIONNAIRE - PHQ9
SUM OF ALL RESPONSES TO PHQ QUESTIONS 1-9: 17
5. POOR APPETITE OR OVEREATING: 2 - MORE THAN HALF THE DAYS
CLINICAL INTERPRETATION OF PHQ2 SCORE: 3
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
## 2019-01-28 ENCOUNTER — OFFICE VISIT (OUTPATIENT)
Dept: BEHAVIORAL HEALTH | Facility: CLINIC | Age: 24
End: 2019-01-28
Payer: COMMERCIAL

## 2019-01-28 DIAGNOSIS — F32.89 OTHER DEPRESSION: ICD-10-CM

## 2019-01-28 DIAGNOSIS — F41.9 ANXIETY: ICD-10-CM

## 2019-01-28 PROCEDURE — 90834 PSYTX W PT 45 MINUTES: CPT | Performed by: SOCIAL WORKER

## 2019-01-28 NOTE — BH THERAPY
[facility]  Therapy Progress Note    Patient Name: Imelda Germain  Patient MRN: [id number]  Today's Date: 1/28/2019     Type of session:Individual psychotherapy  Length of session: 45 minutes  Persons in attendance:Patient    Subjective/New Info: Client reported her anxiety lessened a bit once she started school. She is still having anxiety and depression, however. Pscyhoeducation on the sympathetic and parasympathetic nervous responses, physiological anxiety response, and some coping skills, including breathwork, grounding, and PMR. She denied recent self-harm, intentional calorie restricting, SI or HI.     Objective/Observations:   Participation: Active verbal participation, Attentive, Engaged and Open to feedback   Grooming: Good and Casual   Cognition: Alert and Fully Oriented   Eye contact: Good   Mood: Anxious   Affect: Anxious   Thought process: Logical and Goal-directed   Speech: Rate within normal limits and Volume within normal limits   Other:     Diagnoses:   1. Anxiety    2. Other depression         Current risk:   SUICIDE: Low   Homicide: Not applicable   Self-harm: Low   Relapse: Not applicable   Other:    Safety Plan reviewed? Not Indicated   If evidence of imminent risk is present, intervention/plan:     Therapeutic Intervention(s): Establish rapport and Psychoeducation RE: anxiety    Treatment Goal(s)/Objective(s) addressed:  Collaborative development of treatment goals in following sessions.      Progress toward Treatment Goals: No change in goals, slight improvement in mood    Plan:  - Continue Individual therapy  - Next appointment scheduled:  3/5/2019  - Patient is in agreement with the above plan:  YES    Dahlia Adams L.C.S.W.  1/28/2019

## 2019-03-04 ENCOUNTER — OFFICE VISIT (OUTPATIENT)
Dept: MEDICAL GROUP | Facility: MEDICAL CENTER | Age: 24
End: 2019-03-04
Payer: COMMERCIAL

## 2019-03-04 VITALS
HEART RATE: 96 BPM | HEIGHT: 66 IN | SYSTOLIC BLOOD PRESSURE: 122 MMHG | WEIGHT: 180 LBS | BODY MASS INDEX: 28.93 KG/M2 | DIASTOLIC BLOOD PRESSURE: 76 MMHG | TEMPERATURE: 97.8 F | OXYGEN SATURATION: 96 % | RESPIRATION RATE: 16 BRPM

## 2019-03-04 DIAGNOSIS — F33.1 MODERATE EPISODE OF RECURRENT MAJOR DEPRESSIVE DISORDER (HCC): ICD-10-CM

## 2019-03-04 DIAGNOSIS — Z30.41 ENCOUNTER FOR SURVEILLANCE OF CONTRACEPTIVE PILLS: ICD-10-CM

## 2019-03-04 PROCEDURE — 99214 OFFICE O/P EST MOD 30 MIN: CPT | Performed by: NURSE PRACTITIONER

## 2019-03-04 RX ORDER — NORGESTIMATE AND ETHINYL ESTRADIOL
KIT
Refills: 2 | COMMUNITY
Start: 2019-02-26 | End: 2019-03-04

## 2019-03-04 RX ORDER — LEVONORGESTREL AND ETHINYL ESTRADIOL 0.15-0.03
1 KIT ORAL DAILY
Qty: 84 TAB | Refills: 3 | Status: SHIPPED | OUTPATIENT
Start: 2019-03-04 | End: 2020-04-14

## 2019-03-05 ENCOUNTER — OFFICE VISIT (OUTPATIENT)
Dept: BEHAVIORAL HEALTH | Facility: CLINIC | Age: 24
End: 2019-03-05
Payer: COMMERCIAL

## 2019-03-05 DIAGNOSIS — F41.9 ANXIETY: ICD-10-CM

## 2019-03-05 DIAGNOSIS — F32.89 OTHER DEPRESSION: ICD-10-CM

## 2019-03-05 PROCEDURE — 90834 PSYTX W PT 45 MINUTES: CPT | Performed by: SOCIAL WORKER

## 2019-03-05 NOTE — ASSESSMENT & PLAN NOTE
Taking tri sprintec, would like to only have menses every few months for convenience. Tends to have menstrual cycles that last several weeks once she takes the placebo week of her birth control as well. History of migraines without aura, no personal or family history of clotting disorders or blood clots, no fam history of breast cancer.

## 2019-03-05 NOTE — PROGRESS NOTES
Imelda Germain is a 23 y.o. female here to establish care and discuss the following    HPI:    Moderate episode of recurrent major depressive disorder (HCC)  Currently seeing LCSW with [facility], Dahlia Adams for therapy, establishing with psychiatry in May. Currently taking Cymbalta, alprazolam as needed, and Wellbutrin.  No SI or HI    Encounter for surveillance of contraceptive pills  Taking tri sprintec, would like to only have menses every few months for convenience. Tends to have menstrual cycles that last several weeks once she takes the placebo week of her birth control as well. History of migraines without aura, no personal or family history of clotting disorders or blood clots, no fam history of breast cancer.     Current medicines (including changes today)  Current Outpatient Prescriptions   Medication Sig Dispense Refill   • levonorgestrel-ethinyl estradiol (SEASONALE) 0.15-0.03 MG per tablet Take 1 Tab by mouth every day. 84 Tab 3   • Cetirizine HCl (ZYRTEC ALLERGY) 10 MG Cap Take  by mouth.     • albuterol (PROVENTIL) 2.5mg/3ml Nebu Soln solution for nebulization 3 mL by Nebulization route every four hours as needed for Shortness of Breath. 30 Bullet 1   • buPROPion (WELLBUTRIN) 75 MG Tab Take 1 Tab by mouth every day. 90 Tab 3   • SYMBICORT 160-4.5 MCG/ACT Aerosol Inhale 1 Puff by mouth 2 Times a Day. 3 Inhaler 3   • montelukast (SINGULAIR) 10 MG Tab Take 1 Tab by mouth every day. 90 Tab 3   • DULoxetine (CYMBALTA) 60 MG Cap DR Particles delayed-release capsule Take 1 Cap by mouth every day. 90 Cap 3   • albuterol (PROAIR HFA) 108 (90 Base) MCG/ACT Aero Soln inhalation aerosol Inhale 2 Puffs by mouth every 6 hours as needed for Shortness of Breath. 1 Inhaler 3   • Fluticasone Propionate (FLONASE NA) Spray  in nose.     • DiphenhydrAMINE HCl (BENADRYL ALLERGY PO) Take  by mouth.     • ALPRAZolam (XANAX) 0.25 MG Tab Take 0.25 mg by mouth at bedtime as needed for Sleep.       No  "current facility-administered medications for this visit.      She  has a past medical history of Allergy; Anxiety; Asthma; Depression; Eating disorder, unspecified; Migraine; Moderate persistent asthma without complication (2018); and Self-injurious behavior.  She  has no past surgical history on file.  Social History   Substance Use Topics   • Smoking status: Never Smoker   • Smokeless tobacco: Never Used   • Alcohol use No     Social History     Social History Narrative   • No narrative on file     Family History   Problem Relation Age of Onset   • Depression Mother    • Lung Disease Father         sarcoidosis, lung transplants   • Bipolar disorder Sister    • No Known Problems Brother    • ADD / ADHD Brother    • Depression Brother      Family Status   Relation Status   • Mo Alive   • Fa    • Sis Alive   • Bro Alive   • Bro Alive         ROS  No chest pain, no abdominal pain, no rash.  Positive ROS as per HPI.  All other systems reviewed and are negative      Objective:     Blood pressure 122/76, pulse 96, temperature 36.6 °C (97.8 °F), temperature source Temporal, resp. rate 16, height 1.676 m (5' 5.98\"), weight 81.6 kg (180 lb), last menstrual period 2019, SpO2 96 %, not currently breastfeeding. Body mass index is 29.07 kg/m².     Physical Exam:    Constitutional: Alert, no distress.  Skin: Warm, dry, good turgor, no rashes in visible areas.  Eye: Equal, round and reactive, conjunctiva clear, lids normal.  ENMT: Lips without lesions, good dentition, oropharynx clear.  Neck: Trachea midline, no masses, no thyromegaly. No cervical or supraclavicular lymphadenopathy.  Respiratory: Unlabored respiratory effort, lungs clear to auscultation, no wheezes, no ronchi.  Cardiovascular: Normal S1, S2, no murmur, no edema.  Abdomen: Soft, non-tender, no masses, no hepatosplenomegaly.  Psych: Alert and oriented x3, normal affect and mood.      Assessment and Plan:   The following treatment plan was " discussed    1. Moderate episode of recurrent major depressive disorder (HCC)  Stable  Continue medications as prescribed  Follow-up with psychiatry to discuss any medication changes in April.    2. Encounter for surveillance of contraceptive pills  Unstable  Switch from Tri-Sprintec to Seasonale  We discussed that birth control can increase risk of blood clots, liver tumors, gallbladder disease, and stroke. Side effects can include headache, constipation, nausea, mood swings. They do not protect against STDs, therefore condoms should be worn with new partners. They are not effective if not taken as prescribed, if a dose is missed or delayed a backup method should be used. Antibiotics can make birth control less effective, if an antibiotic is taken a backup method should be used. Any unusual headache, leg pain/swelling, chest pain, shortness of breath, difficulty speaking or moving should be addressed immediately in the ER. Patient verbalizes understanding.     - levonorgestrel-ethinyl estradiol (SEASONALE) 0.15-0.03 MG per tablet; Take 1 Tab by mouth every day.  Dispense: 84 Tab; Refill: 3      Followup: Return in about 1 year (around 3/4/2020), or if symptoms worsen or fail to improve.    I have placed the below orders and discussed them with an approved delegating provider. The MA is performing the below orders under the direction of Dr. Joe

## 2019-03-05 NOTE — BH THERAPY
[facility]  Therapy Progress Note    Patient Name: Imelda Germain  Patient MRN: [id number]  Today's Date: 3/5/2019     Type of session:Individual psychotherapy  Length of session: 45 minutes  Persons in attendance:Patient    Subjective/New Info: Client reported school has been hard, and some of her depression and anxiety are returning. Psychoeducation on the thought-feeling-action triangle, as well as emotions and thinking errors. Will work on ways to catch thinking errors in next session. She reported she quit work to focus on school, and that is helping. She is noticing thoughts related to drinking, and self harming, but is not acting on them. She is not relapsed into her eating disorder, but has noticed a changed relationship with food. Continue to monitor this.     Objective/Observations:   Participation: Active verbal participation   Grooming: Good and Casual   Cognition: Alert and Fully Oriented   Eye contact: Good   Mood: Euthymic   Affect: Congruent with content   Thought process: Logical and Goal-directed   Speech: Rate within normal limits and Volume within normal limits   Other:     Diagnoses:   1. Anxiety    2. Other depression         Current risk:   SUICIDE: Low   Homicide: Not applicable   Self-harm: Low   Relapse: Not applicable   Other:    Safety Plan reviewed? Not Indicated   If evidence of imminent risk is present, intervention/plan:     Therapeutic Intervention(s): Clarify:  Clarify feelings and Clarify thoughts, Cognitive modification and Develop/modify treatment plan    Treatment Goal(s)/Objective(s) addressed: Treatment plan goals addressed today:        - Develop and utilize skills to manage mood and emotional suffering more effectively  - Develop and utilize assertiveness skills to set boundaries & get needs met  - Learn to successfully challenge & change distortions in thinking  - Identify goals/values and cultivate a meaningful life  - Learn to be more emotionally  flexible/resilient in times of stress/challenges  - Eliminate SI/self harming behaviors & increase sense of hope/optimism   - Increase behaviors of self-compassion and self-care           Progress toward Treatment Goals: No change    Plan:  - Continue Individual therapy   - Meet with psychiatrist   - Next appointment scheduled:  3/26/2019  - Patient is in agreement with the above plan:  YES    Dahlia Adams L.C.S.W.  3/5/2019

## 2019-03-05 NOTE — ASSESSMENT & PLAN NOTE
Currently seeing LCSW with [facility], Dahlia Adams for therapy, establishing with psychiatry in May. Currently taking Cymbalta, alprazolam as needed, and Wellbutrin.  No SI or HI

## 2019-03-26 ENCOUNTER — APPOINTMENT (OUTPATIENT)
Dept: BEHAVIORAL HEALTH | Facility: CLINIC | Age: 24
End: 2019-03-26

## 2019-04-09 ENCOUNTER — OFFICE VISIT (OUTPATIENT)
Dept: BEHAVIORAL HEALTH | Facility: CLINIC | Age: 24
End: 2019-04-09
Payer: COMMERCIAL

## 2019-04-09 DIAGNOSIS — F32.89 OTHER DEPRESSION: ICD-10-CM

## 2019-04-09 DIAGNOSIS — F41.9 ANXIETY: ICD-10-CM

## 2019-04-09 PROCEDURE — 90834 PSYTX W PT 45 MINUTES: CPT | Performed by: SOCIAL WORKER

## 2019-04-09 NOTE — BH THERAPY
[facility]  Therapy Progress Note    Patient Name: Imelda Germain  Patient MRN: [id number]  Today's Date: 4/9/2019     Type of session:Individual psychotherapy  Length of session: 45 minutes  Persons in attendance:Patient    Subjective/New Info: Client reported she has been feeling stressed due to school. She is not having thoughts about drinking or self-harming anymore, however. She reported she is trying to pay attention to her thinking distortions, but could use more work identifying them. Discussed ways she can check her thinking, and gave her a series of questions to use to begin thinking about her thinking. Discussed basic steps of self-compassion. Client would benefit from more work around this.     Objective/Observations:   Participation: Active verbal participation and Engaged   Grooming: Good and Casual   Cognition: Alert and Fully Oriented   Eye contact: Good   Mood: Euthymic   Affect: Congruent with content   Thought process: Logical and Goal-directed   Speech: Rate within normal limits and Volume within normal limits   Other:     Diagnoses:   1. Anxiety    2. Other depression         Current risk:   SUICIDE: Low   Homicide: Not applicable   Self-harm: Low   Relapse: Not applicable   Other:    Safety Plan reviewed? Not Indicated   If evidence of imminent risk is present, intervention/plan:     Therapeutic Intervention(s): Self-care skills and Supportive psychotherapy    Treatment Goal(s)/Objective(s) addressed:   · Develop and utilize skills to manage mood and emotional suffering more effectively  · Develop and utilize assertiveness skills to set boundaries & get needs met  · Learn to successfully challenge & change distortions in thinking  · Identify goals/values and cultivate a meaningful life  · Learn to be more emotionally flexible/resilient in times of stress/challenges  · Eliminate SI/self harming behaviors & increase sense of hope/optimism   · Increase behaviors of self-compassion  and self-care    Progress toward Treatment Goals: Mild improvement    Plan:  - Continue Individual therapy  - Next appointment scheduled:  4/23/2019  - Patient is in agreement with the above plan:  YES    HEMANTH SanchezSANNITA  4/9/2019

## 2019-04-15 ENCOUNTER — TELEPHONE (OUTPATIENT)
Dept: MEDICAL GROUP | Age: 24
End: 2019-04-15

## 2019-04-15 DIAGNOSIS — Z23 NEED FOR VACCINATION: ICD-10-CM

## 2019-04-15 NOTE — TELEPHONE ENCOUNTER
Patient is on the MA Schedule 4/17/19 for HPV #3 vaccine/injection.    SPECIFIC Action To Be Taken: Orders pending, please sign.    Pt will be due for immunization at time of appointment

## 2019-04-17 ENCOUNTER — APPOINTMENT (OUTPATIENT)
Dept: MEDICAL GROUP | Age: 24
End: 2019-04-17

## 2019-04-22 ENCOUNTER — NON-PROVIDER VISIT (OUTPATIENT)
Dept: MEDICAL GROUP | Age: 24
End: 2019-04-22
Payer: COMMERCIAL

## 2019-04-22 DIAGNOSIS — Z23 NEED FOR VACCINATION: ICD-10-CM

## 2019-04-22 PROCEDURE — 90471 IMMUNIZATION ADMIN: CPT | Performed by: FAMILY MEDICINE

## 2019-04-22 PROCEDURE — 90651 9VHPV VACCINE 2/3 DOSE IM: CPT | Performed by: FAMILY MEDICINE

## 2019-04-22 NOTE — PROGRESS NOTES
"Imelda Germain is a 23 y.o. female here for a non-provider visit for:   HPV 3 of 3    Reason for immunization: continue or complete series started at the office  Immunization records indicate need for vaccine: Yes, confirmed with Epic  Minimum interval has been met for this vaccine: Yes  ABN completed: Not Indicated    Order and dose verified by: TF  VIS Dated  12/2/2016 was given to patient: Yes  All IAC Questionnaire questions were answered \"No.\"    Patient tolerated injection and no adverse effects were observed or reported: Yes    Pt scheduled for next dose in series: Not Indicated  "

## 2019-04-23 ENCOUNTER — OFFICE VISIT (OUTPATIENT)
Dept: BEHAVIORAL HEALTH | Facility: CLINIC | Age: 24
End: 2019-04-23
Payer: COMMERCIAL

## 2019-04-23 DIAGNOSIS — F41.9 ANXIETY: ICD-10-CM

## 2019-04-23 DIAGNOSIS — F32.89 OTHER DEPRESSION: ICD-10-CM

## 2019-04-23 PROCEDURE — 90834 PSYTX W PT 45 MINUTES: CPT | Performed by: SOCIAL WORKER
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
## 2019-05-10 ENCOUNTER — OFFICE VISIT (OUTPATIENT)
Dept: BEHAVIORAL HEALTH | Facility: CLINIC | Age: 24
End: 2019-05-10
Payer: COMMERCIAL

## 2019-05-10 DIAGNOSIS — F32.89 OTHER DEPRESSION: ICD-10-CM

## 2019-05-10 DIAGNOSIS — F41.9 ANXIETY: ICD-10-CM

## 2019-05-10 PROCEDURE — 90834 PSYTX W PT 45 MINUTES: CPT | Performed by: SOCIAL WORKER

## 2019-05-10 NOTE — BH THERAPY
" [facility]  Therapy Progress Note    Patient Name: Imelda Germain  Patient MRN: [id number]  Today's Date: 5/10/2019     Type of session:Individual psychotherapy  Length of session: 45 minutes  Persons in attendance:Patient    Subjective/New Info: Client reported she is stressed due to finals at school. She engaged in some self-harming behavior (scratching) to help her, but has avoided etoh use. She reported she has been talking with her  about her sense of feeling she disappoints people, as well as her need to people please. She reported she struggles to find ways to calm. Had client practice \"Affectionate Breathing\" and work on sitting and creating space. Will continue to work on self-compassion and embodiment work to improve client's relationship with herself.     Objective/Observations:   Participation: Active verbal participation   Grooming: Good and Casual   Cognition: Alert and Fully Oriented   Eye contact: Good   Mood: Anxious   Affect: Congruent with content   Thought process: Logical and Goal-directed   Speech: Rate within normal limits and Volume within normal limits   Other:     Diagnoses:   1. Anxiety    2. Other depression         Current risk:   SUICIDE: Low   Homicide: Not applicable   Self-harm: Moderate (client engages in superficial self-harming, at mod risk for serious self-harming)    Relapse: Not applicable   Other:    Safety Plan reviewed? Yes - reviewed harm reductions and safe self-harming practices, encouraged client to try cold water instead of breaking the skin with scratching    If evidence of imminent risk is present, intervention/plan:     Therapeutic Intervention(s): Mindfulness     Treatment Goal(s)/Objective(s) addressed:   · Develop and utilize skills to manage mood and emotional suffering more effectively  · Develop and utilize assertiveness skills to set boundaries & get needs met  · Learn to successfully challenge & change distortions in thinking  · Identify " goals/values and cultivate a meaningful life  · Learn to be more emotionally flexible/resilient in times of stress/challenges  · Eliminate SI/self harming behaviors & increase sense of hope/optimism   · Increase behaviors of self-compassion and self-care    Progress toward Treatment Goals: No change    Plan:  - Continue Individual therapy  - Next appointment scheduled:  5/28/2019  - Patient is in agreement with the above plan:  YES    CHRIS Sanchez.SANNITA  5/10/2019

## 2019-05-28 ENCOUNTER — OFFICE VISIT (OUTPATIENT)
Dept: BEHAVIORAL HEALTH | Facility: CLINIC | Age: 24
End: 2019-05-28
Payer: COMMERCIAL

## 2019-05-28 VITALS
WEIGHT: 172 LBS | SYSTOLIC BLOOD PRESSURE: 120 MMHG | BODY MASS INDEX: 27.64 KG/M2 | HEIGHT: 66 IN | RESPIRATION RATE: 14 BRPM | DIASTOLIC BLOOD PRESSURE: 62 MMHG | HEART RATE: 80 BPM

## 2019-05-28 DIAGNOSIS — F41.1 GAD (GENERALIZED ANXIETY DISORDER): ICD-10-CM

## 2019-05-28 DIAGNOSIS — F33.1 MODERATE EPISODE OF RECURRENT MAJOR DEPRESSIVE DISORDER (HCC): ICD-10-CM

## 2019-05-28 PROCEDURE — 99205 OFFICE O/P NEW HI 60 MIN: CPT | Performed by: PSYCHIATRY & NEUROLOGY

## 2019-05-28 RX ORDER — DULOXETIN HYDROCHLORIDE 20 MG/1
20 CAPSULE, DELAYED RELEASE ORAL DAILY
Qty: 7 CAP | Refills: 0 | Status: SHIPPED | OUTPATIENT
Start: 2019-05-28 | End: 2019-07-24

## 2019-05-28 RX ORDER — DESVENLAFAXINE SUCCINATE 50 MG/1
50 TABLET, EXTENDED RELEASE ORAL DAILY
Qty: 30 TAB | Refills: 2 | Status: SHIPPED | OUTPATIENT
Start: 2019-05-28 | End: 2019-07-31

## 2019-05-28 ASSESSMENT — PATIENT HEALTH QUESTIONNAIRE - PHQ9
CLINICAL INTERPRETATION OF PHQ2 SCORE: 2
5. POOR APPETITE OR OVEREATING: 2 - MORE THAN HALF THE DAYS
SUM OF ALL RESPONSES TO PHQ QUESTIONS 1-9: 17
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
## 2019-06-19 ENCOUNTER — OFFICE VISIT (OUTPATIENT)
Dept: BEHAVIORAL HEALTH | Facility: CLINIC | Age: 24
End: 2019-06-19
Payer: COMMERCIAL

## 2019-06-19 VITALS
HEIGHT: 66 IN | SYSTOLIC BLOOD PRESSURE: 122 MMHG | RESPIRATION RATE: 14 BRPM | HEART RATE: 82 BPM | BODY MASS INDEX: 27.64 KG/M2 | WEIGHT: 172 LBS | DIASTOLIC BLOOD PRESSURE: 64 MMHG

## 2019-06-19 DIAGNOSIS — F90.1 ATTENTION DEFICIT HYPERACTIVITY DISORDER (ADHD), PREDOMINANTLY HYPERACTIVE TYPE: ICD-10-CM

## 2019-06-19 DIAGNOSIS — F41.1 GAD (GENERALIZED ANXIETY DISORDER): ICD-10-CM

## 2019-06-19 DIAGNOSIS — F33.0 MILD EPISODE OF RECURRENT MAJOR DEPRESSIVE DISORDER (HCC): ICD-10-CM

## 2019-06-19 PROCEDURE — 90833 PSYTX W PT W E/M 30 MIN: CPT | Performed by: PSYCHIATRY & NEUROLOGY

## 2019-06-19 PROCEDURE — 99213 OFFICE O/P EST LOW 20 MIN: CPT | Performed by: PSYCHIATRY & NEUROLOGY

## 2019-06-19 RX ORDER — BUPROPION HYDROCHLORIDE 150 MG/1
150 TABLET ORAL EVERY MORNING
Qty: 30 TAB | Refills: 2 | Status: SHIPPED | OUTPATIENT
Start: 2019-06-19 | End: 2019-07-31

## 2019-06-19 ASSESSMENT — PATIENT HEALTH QUESTIONNAIRE - PHQ9
CLINICAL INTERPRETATION OF PHQ2 SCORE: 2
SUM OF ALL RESPONSES TO PHQ QUESTIONS 1-9: 7
5. POOR APPETITE OR OVEREATING: 1 - SEVERAL DAYS

## 2019-06-20 NOTE — BH THERAPY
"[facility]  PSYCHIATRIC FOLLOW-UP NOTE    Name: Imelda Germain  MRN: [id number]  : 1995  Age: 23 y.o.  Date of assessment: 2019  PCP: TORI Kelly  Persons in attendance: Patient  Total face-to-face time: 30 minutes    REASON FOR VISIT/CHIEF COMPLAINT (as stated by Patient):  Imelda Germain is a 23 y.o., White female, attending follow-up appointment for   Chief Complaint   Patient presents with   • Medication Management     The patient is seen today for follow up on her depression, anxiety, and ADHD.   .      HISTORY OF PRESENT ILLNESS:    This is a 24 yo female, , full time student, lives with her , seen today for follow up after three weeks. The patient is taking pristiq 50 mg one at night and it is helping her mood and she is less depressed and less anxious. 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 The patient is getting easily distracted and she has trouble finishing her home work. The patient is taking bupropion 75 mg and it is not doing anything. The patient has a history of binge eating behavior.     PSYCHOSOCIAL CHANGES SINCE PREVIOUS CONTACT:  The patient reported that her depression, anxiety improved but trouble with ADHD symptoms.    RESPONSE TO TREATMENT:  She is taking pristiq 50 mg one at night and bupropion 75 mg one a day.    MEDICATION SIDE EFFECTS:  Denied.    REVIEW OF SYSTEMS:        Constitutional negative   Eyes negative   Ears/Nose/Mouth/Throat negative   Cardiovascular negative   Respiratory positive - asthma   Gastrointestinal negative   Genitourinary negative   Muscular negative   Integumentary negative   Neurological positive - migraine   Endocrine negative   Hematologic/Lymphatic negative       PSYCHIATRIC EXAMINATION/MENTAL STATUS  /64   Pulse 82   Resp 14   Ht 1.676 m (5' 5.98\")   Wt 78 kg (172 lb)   BMI 27.77 kg/m²   Participation: Active verbal " participation, Attentive and Engaged  Grooming:Neat  Orientation: Alert and Fully Oriented  Eye contact: Good  Behavior:Calm   Mood: Anxious  Affect: Flexible and Full range  Thought process: Logical and Goal-directed  Thought content:  Within normal limits  Speech: Rate within normal limits and Volume within normal limits  Perception:  Within normal limits  Memory:  No gross evidence of memory deficits  Insight: Good  Judgment: Good  Family/couple interaction observations:   Other:    Current risk:    Suicide: Low   Homicide: Low   Self-harm: Low  Relapse: Low  Other:   Crisis Safety Plan reviewed?Yes  If evidence of imminent risk is present, intervention/plan:    Medical Records/Labs/Diagnostic Tests Reviewed: yes.    Medical Records/Labs/Diagnostic Tests Ordered: none.    DIAGNOSTIC IMPRESSION(S):  1. Mild episode of recurrent major depressive disorder (HCC)    2. JONNATHAN (generalized anxiety disorder)    3. Attention deficit hyperactivity disorder (ADHD), predominantly hyperactive type           ASSESSMENT AND PLAN:    1. Major depression, recurrent, mild.  2. JONNATHAN.  pristiq 50 mg one at night.    3. ADHD.  The patient agreed to fill up adult ADHD-RS-IV with adult prompts and bring it on follow up.  Increase bupropion  mg one a day # 30 refills two.  May try vyvanse on follow up.    4. Follow up in four weeks.     17 minutes were spent in psychotherapy.  (If greater than 16 minutes, add 41026 code)   Topics addressed in psychotherapy include:  Psycho education and cognitive clarifications.  We discussed her negative automatic thoughts and helped her to process it.  Helped her with emotional and behavioral regulations.  Pal Mendoza M.D.

## 2019-07-19 ENCOUNTER — APPOINTMENT (OUTPATIENT)
Dept: BEHAVIORAL HEALTH | Facility: CLINIC | Age: 24
End: 2019-07-19

## 2019-07-24 ENCOUNTER — HOSPITAL ENCOUNTER (OUTPATIENT)
Facility: MEDICAL CENTER | Age: 24
End: 2019-07-24
Attending: NURSE PRACTITIONER
Payer: COMMERCIAL

## 2019-07-24 ENCOUNTER — OFFICE VISIT (OUTPATIENT)
Dept: MEDICAL GROUP | Facility: MEDICAL CENTER | Age: 24
End: 2019-07-24
Payer: COMMERCIAL

## 2019-07-24 VITALS
HEIGHT: 66 IN | DIASTOLIC BLOOD PRESSURE: 80 MMHG | OXYGEN SATURATION: 97 % | BODY MASS INDEX: 27.32 KG/M2 | SYSTOLIC BLOOD PRESSURE: 120 MMHG | HEART RATE: 87 BPM | TEMPERATURE: 98.1 F | WEIGHT: 170 LBS

## 2019-07-24 DIAGNOSIS — D48.9 NEOPLASM OF UNCERTAIN BEHAVIOR: ICD-10-CM

## 2019-07-24 DIAGNOSIS — J45.40 MODERATE PERSISTENT ASTHMA WITHOUT COMPLICATION: ICD-10-CM

## 2019-07-24 DIAGNOSIS — F33.1 MODERATE EPISODE OF RECURRENT MAJOR DEPRESSIVE DISORDER (HCC): ICD-10-CM

## 2019-07-24 DIAGNOSIS — Z11.3 SCREENING EXAMINATION FOR STD (SEXUALLY TRANSMITTED DISEASE): ICD-10-CM

## 2019-07-24 DIAGNOSIS — F41.9 ANXIETY: ICD-10-CM

## 2019-07-24 DIAGNOSIS — F98.8 ATTENTION DEFICIT DISORDER (ADD) WITHOUT HYPERACTIVITY: ICD-10-CM

## 2019-07-24 PROCEDURE — 99214 OFFICE O/P EST MOD 30 MIN: CPT | Performed by: NURSE PRACTITIONER

## 2019-07-24 PROCEDURE — 87491 CHLMYD TRACH DNA AMP PROBE: CPT

## 2019-07-24 PROCEDURE — 87591 N.GONORRHOEAE DNA AMP PROB: CPT

## 2019-07-24 RX ORDER — MONTELUKAST SODIUM 10 MG/1
10 TABLET ORAL DAILY
Qty: 90 TAB | Refills: 3 | Status: SHIPPED | OUTPATIENT
Start: 2019-07-24 | End: 2019-12-16

## 2019-07-24 RX ORDER — BUDESONIDE AND FORMOTEROL FUMARATE DIHYDRATE 160; 4.5 UG/1; UG/1
1 AEROSOL RESPIRATORY (INHALATION) 2 TIMES DAILY
Qty: 3 INHALER | Refills: 3 | Status: SHIPPED | OUTPATIENT
Start: 2019-07-24 | End: 2020-01-31

## 2019-07-25 ENCOUNTER — HOSPITAL ENCOUNTER (OUTPATIENT)
Dept: LAB | Facility: MEDICAL CENTER | Age: 24
End: 2019-07-25
Attending: NURSE PRACTITIONER
Payer: COMMERCIAL

## 2019-07-25 ENCOUNTER — OFFICE VISIT (OUTPATIENT)
Dept: MEDICAL GROUP | Facility: MEDICAL CENTER | Age: 24
End: 2019-07-25
Payer: COMMERCIAL

## 2019-07-25 VITALS
OXYGEN SATURATION: 98 % | TEMPERATURE: 98.4 F | BODY MASS INDEX: 27.32 KG/M2 | HEART RATE: 77 BPM | HEIGHT: 66 IN | WEIGHT: 170 LBS

## 2019-07-25 DIAGNOSIS — D48.9 NEOPLASM OF UNCERTAIN BEHAVIOR: ICD-10-CM

## 2019-07-25 LAB
C TRACH DNA SPEC QL NAA+PROBE: NEGATIVE
N GONORRHOEA DNA SPEC QL NAA+PROBE: NEGATIVE
SPECIMEN SOURCE: NORMAL

## 2019-07-25 PROCEDURE — 11104 PUNCH BX SKIN SINGLE LESION: CPT | Performed by: NURSE PRACTITIONER

## 2019-07-25 PROCEDURE — 88305 TISSUE EXAM BY PATHOLOGIST: CPT

## 2019-07-25 NOTE — ASSESSMENT & PLAN NOTE
Present for about 1.5 years.  Has not significantly changed in size or appearance.  This is a sun exposed area.  Does not recall trauma to the area in the past.  Would like to have this checked as she has been worried about it.

## 2019-07-25 NOTE — ASSESSMENT & PLAN NOTE
Currently asymptomatic.  Has a new partner who will be getting STD tested as well.  She would like to be screened today.

## 2019-07-25 NOTE — ASSESSMENT & PLAN NOTE
Stable on Singulair 10 mg daily and Symbicort 160 mcg 1 puff twice daily.  Requesting refills today until she is able to establish with new PCP.

## 2019-07-25 NOTE — PROGRESS NOTES
Subjective:   Imelda Germain is a 23 y.o. female here today for the following concerns:    Moderate episode of recurrent major depressive disorder (HCC)  Currently seeing Dr. Mendoza, psychiatry with [facility] for her depression, anxiety, and ADD.  Currently working on medication regimen.  She is also seeing a therapist through [facility].  This has been going well, unfortunately due to patient's insurance she will no longer be in network by the end of this month.  She is requesting a new referral to a psychiatrist and therapist outside of [facility].  She has follow-up with her psychiatrist before the end of the month.    Moderate persistent asthma without complication  Stable on Singulair 10 mg daily and Symbicort 160 mcg 1 puff twice daily.  Requesting refills today until she is able to establish with new PCP.    Screening examination for STD (sexually transmitted disease)  Currently asymptomatic.  Has a new partner who will be getting STD tested as well.  She would like to be screened today.    Neoplasm of uncertain behavior  Present for about 1.5 years.  Has not significantly changed in size or appearance.  This is a sun exposed area.  Does not recall trauma to the area in the past.  Would like to have this checked as she has been worried about it.       Current medicines (including changes today)  Current Outpatient Prescriptions   Medication Sig Dispense Refill   • montelukast (SINGULAIR) 10 MG Tab Take 1 Tab by mouth every day. 90 Tab 3   • SYMBICORT 160-4.5 MCG/ACT Aerosol Inhale 1 Puff by mouth 2 Times a Day. 3 Inhaler 3   • buPROPion (WELLBUTRIN XL) 150 MG XL tablet Take 1 Tab by mouth every morning. 30 Tab 2   • desvenlafaxine succinate (PRISTIQ) 50 MG TABLET SR 24 HR Take 1 Tab by mouth every day. 30 Tab 2   • levonorgestrel-ethinyl estradiol (SEASONALE) 0.15-0.03 MG per tablet Take 1 Tab by mouth every day. 84 Tab 3   • albuterol (PROVENTIL) 2.5mg/3ml Nebu Soln solution for nebulization 3 mL by Nebulization  "route every four hours as needed for Shortness of Breath. 30 Bullet 1   • albuterol (PROAIR HFA) 108 (90 Base) MCG/ACT Aero Soln inhalation aerosol Inhale 2 Puffs by mouth every 6 hours as needed for Shortness of Breath. 1 Inhaler 3   • Cetirizine HCl (ZYRTEC ALLERGY) 10 MG Cap Take  by mouth.     • Fluticasone Propionate (FLONASE NA) Spray  in nose.     • DiphenhydrAMINE HCl (BENADRYL ALLERGY PO) Take  by mouth.     • ALPRAZolam (XANAX) 0.25 MG Tab Take 0.25 mg by mouth at bedtime as needed for Sleep.       No current facility-administered medications for this visit.      She  has a past medical history of Allergy; Anxiety; Asthma; Depression; Eating disorder, unspecified; Migraine; Moderate persistent asthma without complication (8/24/2018); and Self-injurious behavior.    ROS   No chest pain, no shortness of breath, no abdominal pain  Positive ROS as per HPI.  All other systems reviewed and are negative.     Objective:     /80 (BP Location: Right arm, Patient Position: Sitting, BP Cuff Size: Adult)   Pulse 87   Temp 36.7 °C (98.1 °F) (Temporal)   Ht 1.676 m (5' 5.98\")   Wt 77.1 kg (170 lb)   SpO2 97%  Body mass index is 27.46 kg/m².     Physical Exam:  Constitutional: Alert, no distress.  Skin: Warm, dry, good turgor, no rashes in visible areas.  Eye: Equal, round, conjunctiva clear, lids normal.  ENMT: Lips without lesions, good dentition  Neck: Trachea midline  Respiratory: Unlabored respiratory effort  Cardiovascular: No edema.  Psych: Alert and oriented x3, normal affect and mood.      Assessment and Plan:   The following treatment plan was discussed    1. Screening examination for STD (sexually transmitted disease)  Check labs, call with results  - HIV AG/AB COMBO ASSAY SCREENING; Future  - RPR (SYPHILIS); Future  - CHLAMYDIA/GC PCR URINE OR SWAB; Future    2. Moderate episode of recurrent major depressive disorder (HCC)  Stable  Continue medications per psychiatry  Referral placed  - REFERRAL TO " PSYCHIATRY  - REFERRAL TO BEHAVIORAL HEALTH    3. Attention deficit disorder (ADD) without hyperactivity  Stable  Continue medications per psychiatry  Referral placed- REFERRAL TO PSYCHIATRY  - REFERRAL TO BEHAVIORAL HEALTH    4. Anxiety  Stable  Continue medications per psychiatry  Referral placed  - REFERRAL TO PSYCHIATRY  - REFERRAL TO BEHAVIORAL HEALTH    5. Moderate persistent asthma without complication  Stable  Continue Singulair daily and Symbicort twice daily.  - montelukast (SINGULAIR) 10 MG Tab; Take 1 Tab by mouth every day.  Dispense: 90 Tab; Refill: 3  - SYMBICORT 160-4.5 MCG/ACT Aerosol; Inhale 1 Puff by mouth 2 Times a Day.  Dispense: 3 Inhaler; Refill: 3    6.  Neoplasm of uncertain behavior  Unstable  Does not look overly concerning for CA, patient is concerned about it.   Return tomorrow for punch biopsy due to size, sudden appearance, and patient concern     Followup: Return in about 1 day (around 7/25/2019) for biopsy.    I have placed the below orders and discussed them with an approved delegating provider. The MA is performing the below orders under the direction of Dr. Joe

## 2019-07-25 NOTE — ASSESSMENT & PLAN NOTE
Currently seeing Dr. Mendoza, psychiatry with [facility] for her depression, anxiety, and ADD.  Currently working on medication regimen.  She is also seeing a therapist through [facility].  This has been going well, unfortunately due to patient's insurance she will no longer be in network by the end of this month.  She is requesting a new referral to a psychiatrist and therapist outside of [facility].  She has follow-up with her psychiatrist before the end of the month.

## 2019-07-25 NOTE — PROGRESS NOTES
Subjective:   Imelda Germain is a 23 y.o. female here today for punch biopsy:    Neoplasm of uncertain behavior  Present for about 1.5 years.  Has not significantly changed in size or appearance.  This is a sun exposed area.  Does not recall trauma to the area in the past.  Would like to have this checked as she has been worried about it.       Current medicines (including changes today)  Current Outpatient Prescriptions   Medication Sig Dispense Refill   • montelukast (SINGULAIR) 10 MG Tab Take 1 Tab by mouth every day. 90 Tab 3   • SYMBICORT 160-4.5 MCG/ACT Aerosol Inhale 1 Puff by mouth 2 Times a Day. 3 Inhaler 3   • buPROPion (WELLBUTRIN XL) 150 MG XL tablet Take 1 Tab by mouth every morning. 30 Tab 2   • desvenlafaxine succinate (PRISTIQ) 50 MG TABLET SR 24 HR Take 1 Tab by mouth every day. 30 Tab 2   • levonorgestrel-ethinyl estradiol (SEASONALE) 0.15-0.03 MG per tablet Take 1 Tab by mouth every day. 84 Tab 3   • albuterol (PROVENTIL) 2.5mg/3ml Nebu Soln solution for nebulization 3 mL by Nebulization route every four hours as needed for Shortness of Breath. 30 Bullet 1   • albuterol (PROAIR HFA) 108 (90 Base) MCG/ACT Aero Soln inhalation aerosol Inhale 2 Puffs by mouth every 6 hours as needed for Shortness of Breath. 1 Inhaler 3   • Cetirizine HCl (ZYRTEC ALLERGY) 10 MG Cap Take  by mouth.     • Fluticasone Propionate (FLONASE NA) Spray  in nose.     • DiphenhydrAMINE HCl (BENADRYL ALLERGY PO) Take  by mouth.     • ALPRAZolam (XANAX) 0.25 MG Tab Take 0.25 mg by mouth at bedtime as needed for Sleep.       No current facility-administered medications for this visit.      She  has a past medical history of Allergy; Anxiety; Asthma; Depression; Eating disorder, unspecified; Migraine; Moderate persistent asthma without complication (8/24/2018); and Self-injurious behavior.    ROS   No chest pain, no shortness of breath, no abdominal pain  Positive ROS as per HPI.  All other systems reviewed and are negative.      "Objective:     Pulse 77   Temp 36.9 °C (98.4 °F) (Temporal)   Ht 1.676 m (5' 5.98\")   Wt 77.1 kg (170 lb)   SpO2 98%  Body mass index is 27.46 kg/m².   Physical Exam:  Constitutional: Alert, no distress.  Skin: Warm, dry, good turgor, approx 1 cm tan, round, flat, macular lesion on left lateral thigh.   Eye: Equal, round, conjunctiva clear, lids normal.  ENMT: Lips without lesions, good dentition  Respiratory: Unlabored respiratory effort  Cardiovascular: Normal S1, S2, no murmur, no edema.  Psych: Alert and oriented x3, normal affect and mood.      Assessment and Plan:   The following treatment plan was discussed    1. Neoplasm of uncertain behavior  After informed written consent was obtained, using Betadine for cleansing and 2% Xylocaine without epinephrine for anesthetic, with sterile technique a 2 mm punch biopsy was used to obtain a biopsy specimen of the lesion. Hemostasis was obtained by pressure and wound was not sutured. Antibiotic dressing is applied, and wound care instructions provided. Be alert for any signs of cutaneous infection. The specimen is labeled and sent to pathology for evaluation. The procedure was well tolerated without complications.        Followup: Return for As Needed.    I have placed the below orders and discussed them with an approved delegating provider. The MA is performing the below orders under the direction of Dr. Joe           "

## 2019-07-26 ENCOUNTER — TELEPHONE (OUTPATIENT)
Dept: MEDICAL GROUP | Facility: MEDICAL CENTER | Age: 24
End: 2019-07-26

## 2019-07-26 LAB — PATHOLOGY CONSULT NOTE: NORMAL

## 2019-07-26 NOTE — TELEPHONE ENCOUNTER
----- Message from TORI Kelly sent at 7/26/2019  8:18 AM PDT -----  Please notify patient that her gonorrhea and Chlamydia screening was negative.    TORI Kelly

## 2019-07-29 ENCOUNTER — OFFICE VISIT (OUTPATIENT)
Dept: BEHAVIORAL HEALTH | Facility: CLINIC | Age: 24
End: 2019-07-29
Payer: COMMERCIAL

## 2019-07-29 DIAGNOSIS — F41.1 GAD (GENERALIZED ANXIETY DISORDER): ICD-10-CM

## 2019-07-29 DIAGNOSIS — F33.0 MILD EPISODE OF RECURRENT MAJOR DEPRESSIVE DISORDER (HCC): ICD-10-CM

## 2019-07-29 PROCEDURE — 90834 PSYTX W PT 45 MINUTES: CPT | Performed by: SOCIAL WORKER

## 2019-07-29 NOTE — BH THERAPY
[facility]  Therapy Progress Note    Patient Name: Imelda Germain  Patient MRN: [id number]  Today's Date: 7/29/2019     Type of session:Individual psychotherapy  Length of session: 45 minutes  Persons in attendance:Patient    Subjective/New Info: Client reported she is doing okay. Functionally, she is doing well in school and maintaining her relationships. She started a new antidepressant, and reported her mood is improved. She stated she still struggles with feeling worthy, and often engages in patterns of behavior that immediately make her less anxious, but reinforce her feelings of unworthiness. She often engages in short term coping, and has little ability for distress tolerance. Client stated she sometimes has trouble figuring out if something is working or not working. Gave her a list of values to help her see where she is making toward moves, and where she is making away moves.     Objective/Observations:              Participation: Active verbal participation              Grooming: Good and Casual              Cognition: Alert and Fully Oriented              Eye contact: Good              Mood: Anxious              Affect: Congruent with content              Thought process: Logical and Goal-directed              Speech: Rate within normal limits and Volume within normal limits              Other:     Diagnoses:   1. JONNATHAN (generalized anxiety disorder)    2. Mild episode of recurrent major depressive disorder (HCC)         Current risk:  SUICIDE: Low              Homicide: Not applicable              Self-harm: Moderate (client engages in superficial self-harming, at low risk for serious self-harming)               Relapse: Not applicable              Other:               Safety Plan reviewed? Not at this time, she has previous safety plan in place               If evidence of imminent risk is present, intervention/plan:  No imminent danger present    Therapeutic Intervention(s): Supportive  psychotherapy    Treatment Goal(s)/Objective(s) addressed:   · Develop and utilize skills to manage mood and emotional suffering more effectively  · Develop and utilize assertiveness skills to set boundaries & get needs met  · Learn to successfully challenge & change distortions in thinking  · Identify goals/values and cultivate a meaningful life  · Learn to be more emotionally flexible/resilient in times of stress/challenges  · Eliminate SI/self harming behaviors & increase sense of hope/optimism   · Increase behaviors of self-compassion and self-care    Progress toward Treatment Goals: Mild improvement    Plan:  - Continue Individual therapy  - Next appointment scheduled:  7/31/2019  - Patient is in agreement with the above plan:  YES    Dahlia Adams, [email or web address].SANNITA  7/29/2019

## 2019-07-31 ENCOUNTER — OFFICE VISIT (OUTPATIENT)
Dept: BEHAVIORAL HEALTH | Facility: CLINIC | Age: 24
End: 2019-07-31
Payer: COMMERCIAL

## 2019-07-31 VITALS
SYSTOLIC BLOOD PRESSURE: 124 MMHG | RESPIRATION RATE: 14 BRPM | BODY MASS INDEX: 27.32 KG/M2 | HEIGHT: 66 IN | HEART RATE: 80 BPM | DIASTOLIC BLOOD PRESSURE: 76 MMHG | WEIGHT: 170 LBS

## 2019-07-31 DIAGNOSIS — F41.1 GAD (GENERALIZED ANXIETY DISORDER): ICD-10-CM

## 2019-07-31 DIAGNOSIS — F90.2 ATTENTION DEFICIT HYPERACTIVITY DISORDER (ADHD), COMBINED TYPE: ICD-10-CM

## 2019-07-31 DIAGNOSIS — F33.0 MILD EPISODE OF RECURRENT MAJOR DEPRESSIVE DISORDER (HCC): ICD-10-CM

## 2019-07-31 PROCEDURE — 90833 PSYTX W PT W E/M 30 MIN: CPT | Performed by: PSYCHIATRY & NEUROLOGY

## 2019-07-31 PROCEDURE — 99213 OFFICE O/P EST LOW 20 MIN: CPT | Performed by: PSYCHIATRY & NEUROLOGY

## 2019-07-31 RX ORDER — DESVENLAFAXINE SUCCINATE 50 MG/1
50 TABLET, EXTENDED RELEASE ORAL DAILY
Qty: 30 TAB | Refills: 5 | Status: SHIPPED | OUTPATIENT
Start: 2019-07-31

## 2019-07-31 RX ORDER — BUPROPION HYDROCHLORIDE 300 MG/1
300 TABLET ORAL EVERY MORNING
Qty: 30 TAB | Refills: 5 | Status: SHIPPED | OUTPATIENT
Start: 2019-07-31

## 2019-07-31 ASSESSMENT — PATIENT HEALTH QUESTIONNAIRE - PHQ9
5. POOR APPETITE OR OVEREATING: 1 - SEVERAL DAYS
SUM OF ALL RESPONSES TO PHQ QUESTIONS 1-9: 7
CLINICAL INTERPRETATION OF PHQ2 SCORE: 2

## 2019-08-01 ENCOUNTER — TELEPHONE (OUTPATIENT)
Dept: MEDICAL GROUP | Facility: MEDICAL CENTER | Age: 24
End: 2019-08-01

## 2019-08-01 NOTE — BH THERAPY
"[facility]  PSYCHIATRIC FOLLOW-UP NOTE    Name: Imelda Germain  MRN: [id number]  : 1995  Age: 23 y.o.  Date of assessment: 2019  PCP: TORI Kelly  Persons in attendance: Patient  Total face-to-face time: 30 minutes    REASON FOR VISIT/CHIEF COMPLAINT (as stated by Patient):  Imelda Germain is a 23 y.o., White female, attending follow-up appointment for   Chief Complaint   Patient presents with   • Medication Management     The patient is seen today for follow up on her depression, anxiety, and ADHD.   .      HISTORY OF PRESENT ILLNESS:    This is a 24 yo female, , full time student, lives with her , seen today for follow up. The patient filled out adult ADHD-RS-IV 18 item scale and she scored moderate to severe symptoms.The patient has been taking bupropion and pristiq everyday The patient also reported that this is the last visit because her insurance in changing. The patients primary referred her and she has an appointment with new psychiatrist in three months.     PSYCHOSOCIAL CHANGES SINCE PREVIOUS CONTACT:  The patient reported syptoms of ADHD.     RESPONSE TO TREATMENT:  She is taking bupropion  mg one a day and pristiq 50 mg at night.    MEDICATION SIDE EFFECTS:  Denied.    REVIEW OF SYSTEMS:        Constitutional negative   Eyes negative   Ears/Nose/Mouth/Throat negative   Cardiovascular negative   Respiratory positive - astham.   Gastrointestinal negative   Genitourinary negative   Muscular negative   Integumentary negative   Neurological positive - migraine.   Endocrine negative   Hematologic/Lymphatic negative       PSYCHIATRIC EXAMINATION/MENTAL STATUS  /76   Pulse 80   Resp 14   Ht 1.676 m (5' 5.98\")   Wt 77.1 kg (170 lb)   BMI 27.45 kg/m²   Participation: Active verbal participation, Attentive and Engaged  Grooming:Casual  Orientation: Alert and Fully Oriented  Eye contact: Good  Behavior:Calm   Mood: Anxious  Affect: Flexible " and Full range  Thought process: Logical and Goal-directed  Thought content:  Within normal limits  Speech: Rate within normal limits and Volume within normal limits  Perception:  Within normal limits  Memory:  No gross evidence of memory deficits  Insight: Good  Judgment: Good  Family/couple interaction observations:   Other:    Current risk:    Suicide: Low   Homicide: Low   Self-harm: Low  Relapse: Low  Other:   Crisis Safety Plan reviewed?Yes  If evidence of imminent risk is present, intervention/plan:    Medical Records/Labs/Diagnostic Tests Reviewed: yes.    Medical Records/Labs/Diagnostic Tests Ordered: none.    DIAGNOSTIC IMPRESSION(S):  1. Mild episode of recurrent major depressive disorder (HCC)    2. JONNATHAN (generalized anxiety disorder)    3. Attention deficit hyperactivity disorder (ADHD), combined type           ASSESSMENT AND PLAN:  1. Major depression, recurrent, mild.  2, JONNATHAN.  pristiq 50 mg one at night # 30 refills five.    3. ADHD.  Increase bupropion  mg one a day # 30 refills five.    4. Follow up with the new provider in three months.  We discussed termination of treatment with this provider today.    17. minutes were spent in psychotherapy.  (If greater than 16 minutes, add 63346 code)   Topics addressed in psychotherapy include:  We discussed her unresolved emotional issues and helped her with emotional skills.  Cognitive restructuring and behavioral changes.  Helped her with emotional and behavioral regulations.  Pal Mendoza M.D.

## 2019-08-01 NOTE — TELEPHONE ENCOUNTER
----- Message from TORI Kelly sent at 7/30/2019  4:36 PM PDT -----  Please notify patient that her biopsy was benign, or non cancerous. It indicated chronic inflammation or dermatitis. She should try a topical steroid cream twice daily to see if this resolves.     TORI Kelly

## 2019-09-18 ENCOUNTER — OFFICE VISIT (OUTPATIENT)
Dept: BEHAVIORAL HEALTH | Facility: CLINIC | Age: 24
End: 2019-09-18
Payer: COMMERCIAL

## 2019-09-18 DIAGNOSIS — F50.9 EATING DISORDER, UNSPECIFIED TYPE: ICD-10-CM

## 2019-09-18 DIAGNOSIS — F33.0 MILD EPISODE OF RECURRENT MAJOR DEPRESSIVE DISORDER (HCC): ICD-10-CM

## 2019-09-18 DIAGNOSIS — F41.1 GAD (GENERALIZED ANXIETY DISORDER): ICD-10-CM

## 2019-09-18 PROCEDURE — 90834 PSYTX W PT 45 MINUTES: CPT | Performed by: SOCIAL WORKER

## 2019-09-18 NOTE — BH THERAPY
[facility]  Therapy Progress Note    Patient Name: Imelda Germain  Patient MRN: [id number]  Today's Date: 9/18/2019     Type of session:Individual psychotherapy  Length of session: 45 minutes  Persons in attendance:Patient    Subjective/New Info: Client reported she is tired and feeling low. She stated she has lost her hunger cues due to her medication, and is struggling with eating. She reported she doesn't enjoy eating, and feels irritable when friends do make her eat. She reported she isn't monitoring her weight, but has noticed her clothes are slightly looser. She reported feeling frustrated with school. She is withdrawing socially from almost everyone but her . She disclosed she gets a lot of her sense of self worth from helping other people, but this often leads to her feeling like she has little energy and is empty. She is a caretaker, but finds this often leaves her extending herself for others, but not herself. Psychoeducation on locus of control. Client identified that she often acts in areas that are out of her control. Encouraged client to look at her own self-worth, and identify things she feels make her worthy of being treated with respect. Asked her to also look at things she feels make others worthy of being treated with respect.     Set up client with future appointments as well as a new prescribing provider.     Objective/Observations:   Participation: Active verbal participation and Engaged   Grooming: Adequate and Casual   Cognition: Alert and Fully Oriented   Eye contact: Good   Mood: Depressed   Affect: Constricted   Thought process: Logical and Goal-directed   Speech: Rate within normal limits and Volume within normal limits   Other:     Diagnoses:   1. Mild episode of recurrent major depressive disorder (HCC)    2. JONNATHAN (generalized anxiety disorder)    3. Eating disorder, unspecified type         Current risk:   SUICIDE: Low   Homicide: Not applicable   Self-harm: Low  (disordered eating)   Relapse: Not applicable   Other:    Safety Plan reviewed? Not Indicated   If evidence of imminent risk is present, intervention/plan:     Therapeutic Intervention(s): Self-care skills, Stressors assessed and Supportive psychotherapy    Treatment Goal(s)/Objective(s) addressed:   · Develop and utilize skills to manage mood and emotional suffering more effectively  · Develop and utilize assertiveness skills to set boundaries & get needs met  · Learn to successfully challenge & change distortions in thinking  · Identify goals/values and cultivate a meaningful life  · Learn to be more emotionally flexible/resilient in times of stress/challenges  · Eliminate SI/self harming behaviors & increase sense of hope/optimism   · Increase behaviors of self-compassion and self-care    Progress toward Treatment Goals: Mild decline    Plan:  - Continue Individual therapy and Medication management  - Next appointment scheduled:  10/15/2019  - Patient is in agreement with the above plan:  YES    Dahlia Adams L.C.S.W.  9/18/2019

## 2019-10-15 ENCOUNTER — OFFICE VISIT (OUTPATIENT)
Dept: BEHAVIORAL HEALTH | Facility: CLINIC | Age: 24
End: 2019-10-15
Payer: COMMERCIAL

## 2019-10-15 DIAGNOSIS — F50.9 EATING DISORDER, UNSPECIFIED TYPE: ICD-10-CM

## 2019-10-15 DIAGNOSIS — F33.0 MILD EPISODE OF RECURRENT MAJOR DEPRESSIVE DISORDER (HCC): ICD-10-CM

## 2019-10-15 DIAGNOSIS — F41.1 GAD (GENERALIZED ANXIETY DISORDER): ICD-10-CM

## 2019-10-15 PROCEDURE — 90834 PSYTX W PT 45 MINUTES: CPT | Performed by: SOCIAL WORKER

## 2019-10-15 NOTE — BH THERAPY
[facility]  Therapy Progress Note    Patient Name: Imelda Germain  Patient MRN: [id number]  Today's Date: 10/15/2019     Type of session:Individual psychotherapy  Length of session: 45 minutes  Persons in attendance:Patient    Subjective/New Info: Client was informed of clinician's changing schedule. She reported she has been struggling more lately, especially with her eating. Discussed transitioning her to a new clinician with eating disorder training. Discussed a referral to [facility]. Client seemed agreeable, but nervous. Discussed client's concerns. She is aware that she needs specific help for her disordered eating, but stated she feels nervous. She reported she has been isolating more as well. Processed what client finds worthy of respect. She noted she finds it easier to give respect to others than herself, and par of that is she doesn't know herself well. Engaged in come values clarification, and encouraged client to fin ways to bring some of her values into her life.     Objective/Observations:   Participation: Active verbal participation   Grooming: Good and Casual   Cognition: Alert and Fully Oriented   Eye contact: Good   Mood: Anxious   Affect: Anxious   Thought process: Logical and Goal-directed   Speech: Rate within normal limits and Volume within normal limits   Other:     Diagnoses:   1. Mild episode of recurrent major depressive disorder (HCC)    2. JONNATHAN (generalized anxiety disorder)    3. Eating disorder, unspecified type         Current risk:   SUICIDE: Low   Homicide: Not applicable   Self-harm: Low (disordered eating)   Relapse: Not applicable   Other:    Safety Plan reviewed? Not Indicated   If evidence of imminent risk is present, intervention/plan:     Therapeutic Intervention(s): Clarify:  Clarify values and Supportive psychotherapy    Treatment Goal(s)/Objective(s) addressed:   · Develop and utilize skills to manage mood and emotional suffering more  effectively  · Develop and utilize assertiveness skills to set boundaries & get needs met  · Learn to successfully challenge & change distortions in thinking  · Identify goals/values and cultivate a meaningful life  · Learn to be more emotionally flexible/resilient in times of stress/challenges  · Eliminate SI/self harming behaviors & increase sense of hope/optimism   · Increase behaviors of self-compassion and self-care    Progress toward Treatment Goals: Mild decline    Plan:  - Client has been referred to an eating disorder specialist to address the worsening symptoms of her eating disorder     TRUMAN Sanchez.[email or web address].LUIS ARMANDO.  10/15/2019

## 2019-11-10 DIAGNOSIS — J45.40 MODERATE PERSISTENT ASTHMA WITHOUT COMPLICATION: ICD-10-CM

## 2019-11-14 RX ORDER — BUDESONIDE AND FORMOTEROL FUMARATE DIHYDRATE 160; 4.5 UG/1; UG/1
AEROSOL RESPIRATORY (INHALATION)
Qty: 30.6 G | Refills: 5 | Status: SHIPPED | OUTPATIENT
Start: 2019-11-14 | End: 2020-01-31

## 2019-11-29 ENCOUNTER — OFFICE VISIT (OUTPATIENT)
Dept: URGENT CARE | Facility: MEDICAL CENTER | Age: 24
End: 2019-11-29
Payer: COMMERCIAL

## 2019-11-29 VITALS
TEMPERATURE: 98.3 F | HEART RATE: 121 BPM | WEIGHT: 165.8 LBS | RESPIRATION RATE: 12 BRPM | SYSTOLIC BLOOD PRESSURE: 112 MMHG | HEIGHT: 66 IN | DIASTOLIC BLOOD PRESSURE: 62 MMHG | BODY MASS INDEX: 26.65 KG/M2 | OXYGEN SATURATION: 95 %

## 2019-11-29 DIAGNOSIS — J45.40 MODERATE PERSISTENT REACTIVE AIRWAY DISEASE WITHOUT COMPLICATION: ICD-10-CM

## 2019-11-29 DIAGNOSIS — B96.89 ACUTE BACTERIAL RHINOSINUSITIS: ICD-10-CM

## 2019-11-29 DIAGNOSIS — J01.90 ACUTE BACTERIAL RHINOSINUSITIS: ICD-10-CM

## 2019-11-29 PROCEDURE — 99214 OFFICE O/P EST MOD 30 MIN: CPT | Performed by: EMERGENCY MEDICINE

## 2019-11-29 RX ORDER — INHALER, ASSIST DEVICES
1 SPACER (EA) MISCELLANEOUS ONCE
Qty: 1 EACH | Refills: 0 | Status: SHIPPED | OUTPATIENT
Start: 2019-11-29 | End: 2019-11-29

## 2019-11-29 RX ORDER — PREDNISONE 20 MG/1
TABLET ORAL
Qty: 16 TAB | Refills: 0 | Status: SHIPPED | OUTPATIENT
Start: 2019-11-29

## 2019-11-29 RX ORDER — DESVENLAFAXINE 25 MG/1
TABLET, EXTENDED RELEASE ORAL
Refills: 1 | COMMUNITY
Start: 2019-11-19

## 2019-11-29 RX ORDER — DOXYCYCLINE HYCLATE 100 MG
100 TABLET ORAL 2 TIMES DAILY
Qty: 14 TAB | Refills: 0 | Status: SHIPPED | OUTPATIENT
Start: 2019-11-29

## 2019-11-29 ASSESSMENT — ENCOUNTER SYMPTOMS
WHEEZING: 0
HEMOPTYSIS: 0
FOCAL WEAKNESS: 0
SHORTNESS OF BREATH: 0
VOMITING: 0
RHINORRHEA: 1
HEADACHES: 1
DIARRHEA: 0
COUGH: 1
FEVER: 1
SPUTUM PRODUCTION: 0
SENSORY CHANGE: 0
SORE THROAT: 1
SINUS PAIN: 1

## 2019-11-29 NOTE — PROGRESS NOTES
Subjective:      Imelda Germain is a 23 y.o. female who presents with Cough (x2weeks, cough, chest congestion, fever, difficulty breathing, productive cough, sinus congestion)            URI    This is a new problem. Episode onset: 2 weeks. The problem has been gradually worsening. Maximum temperature: recent. Associated symptoms include chest pain, congestion, coughing, headaches, a plugged ear sensation, rhinorrhea, sinus pain and a sore throat. Pertinent negatives include no diarrhea, ear pain, rash, vomiting or wheezing. She has tried sleep and inhaler use for the symptoms. The treatment provided mild relief.       Review of Systems   Constitutional: Positive for fever.   HENT: Positive for congestion, rhinorrhea, sinus pain and sore throat. Negative for ear pain, hearing loss and nosebleeds.         Purulent nasal discharge noted this week   Respiratory: Positive for cough. Negative for hemoptysis, sputum production, shortness of breath and wheezing.    Cardiovascular: Positive for chest pain.        Anterior chest tightness associated.   Gastrointestinal: Negative for diarrhea and vomiting.   Skin: Negative for rash.   Neurological: Positive for headaches. Negative for sensory change and focal weakness.       PMH:  has a past medical history of Allergy, Anxiety, Asthma, Depression, Eating disorder, unspecified, Migraine, Moderate persistent asthma without complication (8/24/2018), and Self-injurious behavior.  MEDS:   Current Outpatient Medications:   •  Desvenlafaxine Succinate ER 25 MG TABLET SR 24 HR, TK 1 T PO D, Disp: , Rfl: 1  •  predniSONE (DELTASONE) 20 MG Tab, Take 3 tablets once a day for 2 days, then 2 tablets once a day for 5 days., Disp: 16 Tab, Rfl: 0  •  doxycycline (VIBRAMYCIN) 100 MG Tab, Take 1 Tab by mouth 2 times a day., Disp: 14 Tab, Rfl: 0  •  Spacer/Aero-Holding Chambers (AEROCHAMBER MV) Misc, 1 Each by Does not apply route Once for 1 dose., Disp: 1 Each, Rfl: 0  •  desvenlafaxine  succinate (PRISTIQ) 50 MG TABLET SR 24 HR, Take 1 Tab by mouth every day., Disp: 30 Tab, Rfl: 5  •  buPROPion (WELLBUTRIN XL) 300 MG XL tablet, Take 1 Tab by mouth every morning., Disp: 30 Tab, Rfl: 5  •  montelukast (SINGULAIR) 10 MG Tab, Take 1 Tab by mouth every day., Disp: 90 Tab, Rfl: 3  •  SYMBICORT 160-4.5 MCG/ACT Aerosol, Inhale 1 Puff by mouth 2 Times a Day., Disp: 3 Inhaler, Rfl: 3  •  levonorgestrel-ethinyl estradiol (SEASONALE) 0.15-0.03 MG per tablet, Take 1 Tab by mouth every day., Disp: 84 Tab, Rfl: 3  •  albuterol (PROVENTIL) 2.5mg/3ml Nebu Soln solution for nebulization, 3 mL by Nebulization route every four hours as needed for Shortness of Breath., Disp: 30 Bullet, Rfl: 1  •  albuterol (PROAIR HFA) 108 (90 Base) MCG/ACT Aero Soln inhalation aerosol, Inhale 2 Puffs by mouth every 6 hours as needed for Shortness of Breath., Disp: 1 Inhaler, Rfl: 3  •  Cetirizine HCl (ZYRTEC ALLERGY) 10 MG Cap, Take  by mouth., Disp: , Rfl:   •  DiphenhydrAMINE HCl (BENADRYL ALLERGY PO), Take  by mouth., Disp: , Rfl:   •  ALPRAZolam (XANAX) 0.25 MG Tab, Take 0.25 mg by mouth at bedtime as needed for Sleep., Disp: , Rfl:   •  SYMBICORT 160-4.5 MCG/ACT Aerosol, INHALE 1 PUFF BY MOUTH TWICE DAILY (Patient not taking: Reported on 11/29/2019), Disp: 30.6 g, Rfl: 5  •  Fluticasone Propionate (FLONASE NA), Spray  in nose., Disp: , Rfl:   ALLERGIES:   Allergies   Allergen Reactions   • Erythromycin Vomiting   • Amoxicillin    • Latex Rash     Throat scratchy and hand itchy       SURGHX: History reviewed. No pertinent surgical history.  SOCHX:  reports that she has never smoked. She has never used smokeless tobacco. She reports that she does not drink alcohol or use drugs.  FH: family history includes ADD / ADHD in her brother; Bipolar disorder in her sister; Depression in her brother and mother; Lung Disease in her father; No Known Problems in her brother.   Objective:     /62 (BP Location: Left arm, Patient Position:  "Sitting, BP Cuff Size: Adult)   Pulse (!) 121   Temp 36.8 °C (98.3 °F) (Temporal)   Resp 12   Ht 1.676 m (5' 6\")   Wt 75.2 kg (165 lb 12.8 oz)   SpO2 95%   BMI 26.76 kg/m²      Physical Exam  Constitutional:       General: She is not in acute distress.     Appearance: She is well-developed. She is not ill-appearing.   HENT:      Head: Normocephalic.      Right Ear: Tympanic membrane and ear canal normal.      Left Ear: Tympanic membrane and ear canal normal.      Nose: Mucosal edema and rhinorrhea present.      Mouth/Throat:      Lips: Pink.      Mouth: Mucous membranes are moist.      Pharynx: Uvula midline. Posterior oropharyngeal erythema present.   Eyes:      Conjunctiva/sclera: Conjunctivae normal.   Neck:      Musculoskeletal: Neck supple.      Trachea: Trachea and phonation normal.   Cardiovascular:      Rate and Rhythm: Normal rate and regular rhythm.      Heart sounds: Normal heart sounds.   Pulmonary:      Effort: Pulmonary effort is normal.      Breath sounds: No decreased breath sounds, wheezing, rhonchi or rales.   Lymphadenopathy:      Cervical: No cervical adenopathy.   Skin:     General: Skin is warm and dry.   Neurological:      Mental Status: She is alert and oriented to person, place, and time.   Psychiatric:         Behavior: Behavior is cooperative.                 Assessment/Plan:       1. Acute bacterial rhinosinusitis  Recommended supportive care measures, including rest, increasing oral fluid intake and use of over-the-counter medications for relief of symptoms.  Based on duration, worsening:  - doxycycline (VIBRAMYCIN) 100 MG Tab; Take 1 Tab by mouth 2 times a day.  Dispense: 14 Tab; Refill: 0    2. Moderate persistent reactive airway disease without complication  Continue Symbicort, use albuterol with spacer  - predniSONE (DELTASONE) 20 MG Tab; Take 3 tablets once a day for 2 days, then 2 tablets once a day for 5 days.  Dispense: 16 Tab; Refill: 0  - Spacer/Aero-Holding Chambers " (AEROCHAMBER MV) Misc; 1 Each by Does not apply route Once for 1 dose.  Dispense: 1 Each; Refill: 0

## 2020-01-31 ENCOUNTER — TELEPHONE (OUTPATIENT)
Dept: MEDICAL GROUP | Facility: MEDICAL CENTER | Age: 25
End: 2020-01-31

## 2020-01-31 DIAGNOSIS — J45.40 MODERATE PERSISTENT ASTHMA WITHOUT COMPLICATION: ICD-10-CM

## 2020-01-31 RX ORDER — BUDESONIDE AND FORMOTEROL FUMARATE DIHYDRATE 160; 4.5 UG/1; UG/1
1 AEROSOL RESPIRATORY (INHALATION) 2 TIMES DAILY
Qty: 1 INHALER | Refills: 11 | Status: SHIPPED | OUTPATIENT
Start: 2020-01-31 | End: 2020-08-04 | Stop reason: SDUPTHER

## 2020-02-01 NOTE — TELEPHONE ENCOUNTER
----- Message from Judy Calix, Med Ass't sent at 1/29/2020 12:40 PM PST -----  Regarding: FW: Prescription Question  Contact: [phone]    ----- Message -----  From: Imelda Germain  Sent: 1/29/2020  12:32 PM PST  To: Alaina Moise  Subject: Prescription Question                            Hello,    When I called my pharmacy today to renew my Symbicort prescription, they told me that there was new generic available for it, but they needed an ok on file in order for me to have it. Is it possible for you to ok the generic for that prescription?     Thank you,    Imelda Germain

## 2020-02-24 ENCOUNTER — OFFICE VISIT (OUTPATIENT)
Dept: MEDICAL GROUP | Facility: MEDICAL CENTER | Age: 25
End: 2020-02-24
Payer: COMMERCIAL

## 2020-02-24 VITALS
HEIGHT: 66 IN | SYSTOLIC BLOOD PRESSURE: 110 MMHG | WEIGHT: 168 LBS | DIASTOLIC BLOOD PRESSURE: 70 MMHG | TEMPERATURE: 98.5 F | HEART RATE: 103 BPM | OXYGEN SATURATION: 100 % | BODY MASS INDEX: 27 KG/M2

## 2020-02-24 DIAGNOSIS — F33.1 MODERATE EPISODE OF RECURRENT MAJOR DEPRESSIVE DISORDER (HCC): ICD-10-CM

## 2020-02-24 DIAGNOSIS — F98.8 ATTENTION DEFICIT DISORDER (ADD) WITHOUT HYPERACTIVITY: ICD-10-CM

## 2020-02-24 DIAGNOSIS — Z79.899 ON STIMULANT MEDICATION: ICD-10-CM

## 2020-02-24 PROCEDURE — 99214 OFFICE O/P EST MOD 30 MIN: CPT | Performed by: NURSE PRACTITIONER

## 2020-02-25 NOTE — ASSESSMENT & PLAN NOTE
Currently seeing Latanya Raymond, psychiatric NP with [facility] for her depression and anxiety and a therapist as well. She is currently on Prestiq 75 mg daily for depression and anxiety and bupropion  mg daily for ADHD.     Her mental health provider recently recommended that she try stimulant medication for her uncontrolled ADHD. Other options were discussed, she also has a history of eating disorder, therefore she did not want to increase welbutrin due to appetite lowering effect. She also does not want to do Strattera as she would need to decrease her Pristiq, and her depression is currently well controlled on current dose.

## 2020-02-26 NOTE — PROGRESS NOTES
Subjective:   Imelda Germain is a 24 y.o. female here today for the following concerns:    Attention deficit disorder (ADD) without hyperactivity  Currently seeing Latanya Raymond, psychiatric NP with [facility] for her depression and anxiety and a therapist as well. She is currently on Prestiq 75 mg daily for depression and anxiety and bupropion  mg daily for ADHD.     Her mental health provider recently recommended that she try stimulant medication for her uncontrolled ADHD. Other options were discussed, she also has a history of eating disorder, therefore she did not want to increase welbutrin due to appetite lowering effect. She also does not want to do Strattera as she would need to decrease her Pristiq, and her depression is currently well controlled on current dose.     Moderate episode of recurrent major depressive disorder (HCC)  Chronic. Currently stable on Pristiq 75 mg daily and wellbutrin 300 mg daily.        Current medicines (including changes today)  Current Outpatient Medications   Medication Sig Dispense Refill   • budesonide-formoterol (SYMBICORT) 160-4.5 MCG/ACT Aerosol Inhale 1 Puff by mouth 2 Times a Day. 1 Inhaler 11   • montelukast (SINGULAIR) 10 MG Tab TAKE 1 TABLET BY MOUTH DAILY 90 Tab 2   • Desvenlafaxine Succinate ER 25 MG TABLET SR 24 HR TK 1 T PO D  1   • predniSONE (DELTASONE) 20 MG Tab Take 3 tablets once a day for 2 days, then 2 tablets once a day for 5 days. 16 Tab 0   • doxycycline (VIBRAMYCIN) 100 MG Tab Take 1 Tab by mouth 2 times a day. 14 Tab 0   • desvenlafaxine succinate (PRISTIQ) 50 MG TABLET SR 24 HR Take 1 Tab by mouth every day. 30 Tab 5   • buPROPion (WELLBUTRIN XL) 300 MG XL tablet Take 1 Tab by mouth every morning. 30 Tab 5   • levonorgestrel-ethinyl estradiol (SEASONALE) 0.15-0.03 MG per tablet Take 1 Tab by mouth every day. 84 Tab 3   • albuterol (PROVENTIL) 2.5mg/3ml Nebu Soln solution for nebulization 3 mL by Nebulization route every four hours  "as needed for Shortness of Breath. 30 Bullet 1   • albuterol (PROAIR HFA) 108 (90 Base) MCG/ACT Aero Soln inhalation aerosol Inhale 2 Puffs by mouth every 6 hours as needed for Shortness of Breath. 1 Inhaler 3   • Cetirizine HCl (ZYRTEC ALLERGY) 10 MG Cap Take  by mouth.     • Fluticasone Propionate (FLONASE NA) Spray  in nose.     • DiphenhydrAMINE HCl (BENADRYL ALLERGY PO) Take  by mouth.     • ALPRAZolam (XANAX) 0.25 MG Tab Take 0.25 mg by mouth at bedtime as needed for Sleep.       No current facility-administered medications for this visit.      She  has a past medical history of Allergy, Anxiety, Asthma, Depression, Eating disorder, unspecified, Migraine, Moderate persistent asthma without complication (8/24/2018), and Self-injurious behavior.    ROS   No chest pain, no shortness of breath, no abdominal pain  Positive ROS as per HPI.  All other systems reviewed and are negative.     Objective:     /70 (BP Location: Right arm, Patient Position: Sitting)   Pulse (!) 103   Temp 36.9 °C (98.5 °F) (Temporal)   Ht 1.676 m (5' 6\")   Wt 76.2 kg (168 lb)   SpO2 100%  Body mass index is 27.12 kg/m².     Physical Exam:  Constitutional: Alert, no distress.  Skin: Warm, dry, good turgor, no rashes in visible areas.  Eye: Equal, round, conjunctiva clear, lids normal.  ENMT: Lips without lesions, good dentition  Neck: Trachea midline  Respiratory: Unlabored respiratory effort, lungs clear to auscultation, no wheezes, no ronchi.  Cardiovascular: Normal S1, S2, no murmur, no edema.  Psych: Alert and oriented x3, normal affect and mood.      Assessment and Plan:   The following treatment plan was discussed    1. Attention deficit disorder (ADD) without hyperactivity  Unstable  Discussed with patient that stimulant medication also have a side effect of appetite suppression which is the reason her mental health specialist did not want to increase her wellbutrin. They can also worsen depression and anxiety.     If we " are going to start a stimulant I think Ritalin may be the best option as it has lower incidence of appetite suppression and anxiety.    I advised patient that I am going to call her psychiatric nurse practitioner to discuss the options with her and formulate a plan as she is more aware of her underlying mental health issues than I am.  Call placed, awaiting return call.  - PAIN MANAGEMENT SCRN, UR; Future  - Controlled Substance Treatment Agreement    2. Moderate episode of recurrent major depressive disorder (HCC)  Stable  Continue medication and follow-up per behavioral health    3. On stimulant medication  Controlled substance agreement and urine drug screen collected today in the case of starting stimulant in the near future.  - PAIN MANAGEMENT SCRN, UR; Future  - Controlled Substance Treatment Agreement      Followup: Return in about 1 year (around 2/24/2021), or if symptoms worsen or fail to improve.    I have placed the below orders and discussed them with an approved delegating provider. The MA is performing the below orders under the direction of Dr. Joe

## 2020-03-09 ENCOUNTER — TELEPHONE (OUTPATIENT)
Dept: MEDICAL GROUP | Facility: MEDICAL CENTER | Age: 25
End: 2020-03-09

## 2020-03-09 DIAGNOSIS — F98.8 ATTENTION DEFICIT DISORDER (ADD) WITHOUT HYPERACTIVITY: ICD-10-CM

## 2020-03-09 DIAGNOSIS — F50.81 BINGE EATING DISORDER: ICD-10-CM

## 2020-03-09 RX ORDER — LISDEXAMFETAMINE DIMESYLATE CAPSULES 30 MG/1
30 CAPSULE ORAL EVERY MORNING
Qty: 30 CAP | Refills: 0 | Status: SHIPPED | OUTPATIENT
Start: 2020-03-09 | End: 2020-04-08

## 2020-03-09 NOTE — TELEPHONE ENCOUNTER
Patient is currently seeing a psychiatric/mental health nurse practitioner through [facility].  Their visits are through telehealth and not in person.  She is currently treating patient for ADD, depression, anxiety, and binge eating disorder.  [facility]    Patient is currently on Pristiq 50 mg daily and bupropion 300 mg daily which is controlling her depression and anxiety, but not improving her ADD symptoms.  She continues to have significant issues with concentration and focus which is affecting her work.    She was advised by her psychiatric NP to discuss starting a stimulant for her ADD as she feels that she would benefit from this.  Current psychiatric NP is unable to prescribe this as she is seeing her via telehealth and the entity that she works for, [employer], is a non-benzo, non-stimulant office.    I called and spoke with Latanya Raymond, her psychiatric NP over the weekend to verify patient's need for stimulant and discuss potential options as she knows her psychiatric background well.  We agreed to start patient on Vyvanse 30 mg daily and go from there as this will help with the binge eating as well as her ADD symptoms.    Patient notified via my chart that this is the current plan.  Since we discussed this in depth at her previous appointment a few weeks ago, UDS and controlled substance agreement were done at that time in preparation for starting a stimulant.  UDS is consistent.   reviewed and consistent as well.    Patient will come to office and  1 month supply of Vyvanse and follow-up in 4 weeks to reevaluate symptoms.    JOSEPH Kelly.

## 2020-04-06 ENCOUNTER — APPOINTMENT (OUTPATIENT)
Dept: MEDICAL GROUP | Facility: MEDICAL CENTER | Age: 25
End: 2020-04-06
Payer: COMMERCIAL

## 2020-04-08 ENCOUNTER — OFFICE VISIT (OUTPATIENT)
Dept: MEDICAL GROUP | Facility: MEDICAL CENTER | Age: 25
End: 2020-04-08
Payer: COMMERCIAL

## 2020-04-08 VITALS — TEMPERATURE: 98.2 F | BODY MASS INDEX: 27 KG/M2 | WEIGHT: 168 LBS | HEIGHT: 66 IN

## 2020-04-08 DIAGNOSIS — F98.8 ATTENTION DEFICIT DISORDER (ADD) WITHOUT HYPERACTIVITY: ICD-10-CM

## 2020-04-08 PROCEDURE — 99213 OFFICE O/P EST LOW 20 MIN: CPT | Mod: 95,CR | Performed by: NURSE PRACTITIONER

## 2020-04-08 ASSESSMENT — FIBROSIS 4 INDEX: FIB4 SCORE: 0.49

## 2020-04-08 NOTE — PROGRESS NOTES
Telemedicine Visit: Established Patient     This encounter was conducted via Zoom .   Verbal consent was obtained. Patient's identity was verified.    Subjective:   CC: follow up on ADD  Imelda Germain is a 24 y.o. female presenting for evaluation and management of:    Attention deficit disorder (ADD) without hyperactivity  Started on Vyvanse 30 mg daily for ADD and binge eating disorder. Has not noticed an improvement in her ADD symptoms at this time. She is also wondering if there is any alternative as this is costing her 50 dollars per month with coupon. Did notice some increased heart rate initially. Has has some decreased appetite to where she wont think to eat until she is very hungry.     Denies chest pain, insomnia. Does not weigh herself at home.        ROS   Denies any recent fevers or chills. No nausea or vomiting. No chest pains or shortness of breath.     Allergies   Allergen Reactions   • Erythromycin Vomiting   • Amoxicillin    • Ethyl Alcohol [Alcohol]      Hives/swelling   • Latex Rash     Throat scratchy and hand itchy         Current medicines (including changes today)  Current Outpatient Medications   Medication Sig Dispense Refill   • lisdexamfetamine (VYVANSE) 30 MG capsule Take 1 Cap by mouth every morning for 30 days. 30 Cap 0   • budesonide-formoterol (SYMBICORT) 160-4.5 MCG/ACT Aerosol Inhale 1 Puff by mouth 2 Times a Day. 1 Inhaler 11   • montelukast (SINGULAIR) 10 MG Tab TAKE 1 TABLET BY MOUTH DAILY 90 Tab 2   • Desvenlafaxine Succinate ER 25 MG TABLET SR 24 HR TK 1 T PO D  1   • predniSONE (DELTASONE) 20 MG Tab Take 3 tablets once a day for 2 days, then 2 tablets once a day for 5 days. 16 Tab 0   • doxycycline (VIBRAMYCIN) 100 MG Tab Take 1 Tab by mouth 2 times a day. 14 Tab 0   • desvenlafaxine succinate (PRISTIQ) 50 MG TABLET SR 24 HR Take 1 Tab by mouth every day. 30 Tab 5   • buPROPion (WELLBUTRIN XL) 300 MG XL tablet Take 1 Tab by mouth every morning. 30 Tab 5   •  "levonorgestrel-ethinyl estradiol (SEASONALE) 0.15-0.03 MG per tablet Take 1 Tab by mouth every day. 84 Tab 3   • albuterol (PROVENTIL) 2.5mg/3ml Nebu Soln solution for nebulization 3 mL by Nebulization route every four hours as needed for Shortness of Breath. 30 Bullet 1   • albuterol (PROAIR HFA) 108 (90 Base) MCG/ACT Aero Soln inhalation aerosol Inhale 2 Puffs by mouth every 6 hours as needed for Shortness of Breath. 1 Inhaler 3   • Cetirizine HCl (ZYRTEC ALLERGY) 10 MG Cap Take  by mouth.     • Fluticasone Propionate (FLONASE NA) Spray  in nose.     • DiphenhydrAMINE HCl (BENADRYL ALLERGY PO) Take  by mouth.     • ALPRAZolam (XANAX) 0.25 MG Tab Take 0.25 mg by mouth at bedtime as needed for Sleep.       No current facility-administered medications for this visit.        Patient Active Problem List    Diagnosis Date Noted   • Screening examination for STD (sexually transmitted disease) 07/24/2019   • Attention deficit disorder (ADD) without hyperactivity 07/24/2019   • Anxiety 07/24/2019   • Neoplasm of uncertain behavior 07/24/2019   • Encounter for surveillance of contraceptive pills 03/04/2019   • Chest congestion 10/22/2018   • Moderate episode of recurrent major depressive disorder (HCC) 08/24/2018   • Moderate persistent asthma without complication 08/24/2018       Family History   Problem Relation Age of Onset   • Depression Mother    • Lung Disease Father         sarcoidosis, lung transplants   • Bipolar disorder Sister    • No Known Problems Brother    • ADD / ADHD Brother    • Depression Brother        She  has a past medical history of Allergy, Anxiety, Asthma, Depression, Eating disorder, unspecified, Migraine, Moderate persistent asthma without complication (8/24/2018), and Self-injurious behavior.  She  has no past surgical history on file.       Objective:   Vitals obtained by patient:  Temp 36.8 °C (98.2 °F) (Oral)   Ht 1.676 m (5' 6\")   Wt 76.2 kg (168 lb)  Body mass index is 27.12 kg/m². "     Physical Exam:   Constitutional: Alert, no distress, well-groomed.  Skin: No rashes in visible areas.  Eye: Round. Conjunctiva clear, lids normal. No icterus.   ENMT: Lips pink without lesions, good dentition, moist mucous membranes. Phonation normal.  Neck: No masses, no thyromegaly. Moves freely without pain.  CV: Pulse as reported by patient  Respiratory: Unlabored respiratory effort, no cough or audible wheeze  Psych: Alert and oriented x3, normal affect and mood.       Assessment and Plan:   The following treatment plan was discussed:     1. Attention deficit disorder (ADD) without hyperactivity  Unstable  No improvement with Vyvanse and cost is an issue.  Will call and speak with patient's psychiatric NP again who is doing telemedicine from [place] to see what her next option would be as she is not able to prescribe controlled substances through her agency.     Patient will be notified via eigitalt what the plan is once I speak with her.     UDS and consent were missed at last appointment will get these updated ASAP, difficult during pandemic, but may be able to send collection kit to patient's home.     Follow-up: Return in about 3 months (around 7/8/2020).

## 2020-04-08 NOTE — ASSESSMENT & PLAN NOTE
Started on Vyvanse 30 mg daily for ADD and binge eating disorder. Has not noticed an improvement in her ADD symptoms at this time. She is also wondering if there is any alternative as this is costing her 50 dollars per month with coupon. Did notice some increased heart rate initially. Has has some decreased appetite to where she wont think to eat until she is very hungry.     Denies chest pain, insomnia. Does not weigh herself at home.

## 2020-04-13 DIAGNOSIS — Z30.41 ENCOUNTER FOR SURVEILLANCE OF CONTRACEPTIVE PILLS: ICD-10-CM

## 2020-04-14 RX ORDER — LEVONORGESTREL AND ETHINYL ESTRADIOL 0.15-0.03
KIT ORAL
Qty: 90 TAB | Refills: 1 | Status: SHIPPED | OUTPATIENT
Start: 2020-04-14

## 2020-04-16 ENCOUNTER — PATIENT MESSAGE (OUTPATIENT)
Dept: MEDICAL GROUP | Facility: MEDICAL CENTER | Age: 25
End: 2020-04-16

## 2020-04-23 ENCOUNTER — PATIENT MESSAGE (OUTPATIENT)
Dept: MEDICAL GROUP | Facility: MEDICAL CENTER | Age: 25
End: 2020-04-23

## 2020-04-24 ENCOUNTER — PATIENT MESSAGE (OUTPATIENT)
Dept: MEDICAL GROUP | Facility: MEDICAL CENTER | Age: 25
End: 2020-04-24

## 2020-04-27 NOTE — TELEPHONE ENCOUNTER
From: Imelda Germain  To: TORI Prado  Sent: 4/24/2020 3:16 PM PDT  Subject: Prescription Question    Hello!     Laury recently prescribed Vyvanse for my ADHD because my Welbutrin wasn't working well enough for it and my psychiatrist decided it was time to try a stimulant to see if that would help. My psychiatrist is located in [facility] though and only recently got controlled substance prescription privileges for [place], so Laury was working with her in regards to my prescriptions. They placed me on 30 mg Vyvanse daily and took me off the Welbutrin. I had a follow up appointment with Laury about my Vyvanse prescription a couple weeks ago and told her that it didn't really feel like it made a difference in my ability to pay attention and it was also really expensive, so she was going to talk to my psychiatrist about making a change.      ----- Message -----   From:TORI Prado   Sent:4/24/2020 9:13 AM PDT   To:Imelda Germain   Subject:RE: Prescription Question    Laury Membreno is out of the office - can you fill me in so that I can see if I can help?    Laury Brooks, JARETHP-C  Family Medicine   Covering for TORI Kelly       ----- Message -----   From:Imelda Germain   Sent:4/23/2020 8:40 AM PDT   To:TORI Kelly   Subject:Prescription Question    Rickey Schaeffer,    I was wondering if you had been able to talk to my psychiatrist about the Vyvanse issues that we discussed during our last meeting? I just wanted to check because I've been out of my prescription for a few days now and that's made studying and class a bit difficult.    Thanks,    Imelda

## 2020-06-05 ENCOUNTER — TELEPHONE (OUTPATIENT)
Dept: MEDICAL GROUP | Facility: MEDICAL CENTER | Age: 25
End: 2020-06-05

## 2020-06-05 NOTE — TELEPHONE ENCOUNTER
FINAL PRIOR AUTHORIZATION STATUS:    1.  Name of Medication & Dose: VYVANSE Cap 30 mg     2. Prior Auth Status: Approved through 06/03/2021  See  for letter

## 2020-06-13 ENCOUNTER — APPOINTMENT (OUTPATIENT)
Dept: RADIOLOGY | Facility: IMAGING CENTER | Age: 25
End: 2020-06-13
Attending: NURSE PRACTITIONER
Payer: COMMERCIAL

## 2020-06-13 ENCOUNTER — OFFICE VISIT (OUTPATIENT)
Dept: URGENT CARE | Facility: CLINIC | Age: 25
End: 2020-06-13
Payer: COMMERCIAL

## 2020-06-13 VITALS
BODY MASS INDEX: 25.71 KG/M2 | OXYGEN SATURATION: 99 % | WEIGHT: 160 LBS | HEART RATE: 70 BPM | DIASTOLIC BLOOD PRESSURE: 60 MMHG | RESPIRATION RATE: 12 BRPM | SYSTOLIC BLOOD PRESSURE: 106 MMHG | TEMPERATURE: 98.2 F | HEIGHT: 66 IN

## 2020-06-13 DIAGNOSIS — S46.912A SHOULDER STRAIN, LEFT, INITIAL ENCOUNTER: ICD-10-CM

## 2020-06-13 DIAGNOSIS — S49.90XA SHOULDER INJURY, INITIAL ENCOUNTER: ICD-10-CM

## 2020-06-13 PROCEDURE — 73030 X-RAY EXAM OF SHOULDER: CPT | Mod: TC,FY,RT | Performed by: NURSE PRACTITIONER

## 2020-06-13 PROCEDURE — 99213 OFFICE O/P EST LOW 20 MIN: CPT | Performed by: NURSE PRACTITIONER

## 2020-06-13 RX ORDER — GUANFACINE 1 MG/1
1 TABLET ORAL DAILY
COMMUNITY

## 2020-06-13 ASSESSMENT — ENCOUNTER SYMPTOMS
SENSORY CHANGE: 0
FOCAL WEAKNESS: 0
TINGLING: 0
FEVER: 0
CHILLS: 0

## 2020-06-13 ASSESSMENT — FIBROSIS 4 INDEX: FIB4 SCORE: 0.49

## 2020-06-13 NOTE — LETTER
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  If you have any questions or concerns, please don't hesitate to call.        Sincerely,        RIKY Romero.P.RASHID.    Electronically Signed

## 2020-06-13 NOTE — PROGRESS NOTES
Subjective:      Imelda Germain is a 24 y.o. female who presents with Shoulder Pain (LEFT after falling down stairs yesterday )            HPI New. 24 year old female with left shoulder injury following a fall down stairs at home. She denies distal paresthesia, elbow pain or neck pain with this. She has no focal weakness. She reports pain anytime she moves the shoulder, specifically backward. She is right hand dominant. She did take ibuprofen last night. No prior history of injury to this shoulder.  Erythromycin; Amoxicillin; Ethyl alcohol [alcohol]; and Latex  Current Outpatient Medications on File Prior to Visit   Medication Sig Dispense Refill   • guanFACINE (TENEX) 1 MG Tab Take 1 mg by mouth every day.     • INTROVALE 0.15-0.03 MG per tablet TAKE 1 TABLET BY MOUTH EVERY DAY 90 Tab 1   • budesonide-formoterol (SYMBICORT) 160-4.5 MCG/ACT Aerosol Inhale 1 Puff by mouth 2 Times a Day. 1 Inhaler 11   • montelukast (SINGULAIR) 10 MG Tab TAKE 1 TABLET BY MOUTH DAILY 90 Tab 2   • Desvenlafaxine Succinate ER 25 MG TABLET SR 24 HR TK 1 T PO D  1   • desvenlafaxine succinate (PRISTIQ) 50 MG TABLET SR 24 HR Take 1 Tab by mouth every day. 30 Tab 5   • buPROPion (WELLBUTRIN XL) 300 MG XL tablet Take 1 Tab by mouth every morning. 30 Tab 5   • albuterol (PROVENTIL) 2.5mg/3ml Nebu Soln solution for nebulization 3 mL by Nebulization route every four hours as needed for Shortness of Breath. 30 Bullet 1   • albuterol (PROAIR HFA) 108 (90 Base) MCG/ACT Aero Soln inhalation aerosol Inhale 2 Puffs by mouth every 6 hours as needed for Shortness of Breath. 1 Inhaler 3   • Cetirizine HCl (ZYRTEC ALLERGY) 10 MG Cap Take  by mouth.     • Fluticasone Propionate (FLONASE NA) Spray  in nose.     • DiphenhydrAMINE HCl (BENADRYL ALLERGY PO) Take  by mouth.     • ALPRAZolam (XANAX) 0.25 MG Tab Take 0.25 mg by mouth at bedtime as needed for Sleep.     • predniSONE (DELTASONE) 20 MG Tab Take 3 tablets once a day for 2 days, then 2 tablets once  a day for 5 days. 16 Tab 0   • doxycycline (VIBRAMYCIN) 100 MG Tab Take 1 Tab by mouth 2 times a day. 14 Tab 0     No current facility-administered medications on file prior to visit.      Social History     Socioeconomic History   • Marital status:      Spouse name: Not on file   • Number of children: Not on file   • Years of education: Not on file   • Highest education level: Not on file   Occupational History   • Not on file   Social Needs   • Financial resource strain: Not on file   • Food insecurity     Worry: Not on file     Inability: Not on file   • Transportation needs     Medical: Not on file     Non-medical: Not on file   Tobacco Use   • Smoking status: Never Smoker   • Smokeless tobacco: Never Used   Substance and Sexual Activity   • Alcohol use: Yes     Comment: occ   • Drug use: No   • Sexual activity: Yes     Partners: Male     Comment: oral birth control    Lifestyle   • Physical activity     Days per week: Not on file     Minutes per session: Not on file   • Stress: Not on file   Relationships   • Social connections     Talks on phone: Not on file     Gets together: Not on file     Attends Orthodox service: Not on file     Active member of club or organization: Not on file     Attends meetings of clubs or organizations: Not on file     Relationship status: Not on file   • Intimate partner violence     Fear of current or ex partner: Not on file     Emotionally abused: Not on file     Physically abused: Not on file     Forced sexual activity: Not on file   Other Topics Concern   • Not on file   Social History Narrative   • Not on file     Breast Cancer-related family history is not on file.      Review of Systems   Constitutional: Negative for chills and fever.   Musculoskeletal: Positive for joint pain.   Skin: Negative.    Neurological: Negative for tingling, sensory change and focal weakness.          Objective:     /60   Pulse 70   Temp 36.8 °C (98.2 °F) (Temporal)   Resp 12   Ht  "1.676 m (5' 6\")   Wt 72.6 kg (160 lb)   LMP  (Exact Date)   SpO2 99%   Breastfeeding No   BMI 25.82 kg/m²      Physical Exam  Vitals signs and nursing note reviewed.   Constitutional:       Appearance: Normal appearance. She is not ill-appearing.   Cardiovascular:      Rate and Rhythm: Normal rate and regular rhythm.      Heart sounds: No murmur.   Pulmonary:      Effort: Pulmonary effort is normal.      Breath sounds: Normal breath sounds.   Musculoskeletal:      Left shoulder: She exhibits decreased range of motion, tenderness and pain. She exhibits no swelling, no effusion, no crepitus and normal strength.   Skin:     General: Skin is warm and dry.   Neurological:      General: No focal deficit present.      Mental Status: She is alert.   Psychiatric:         Mood and Affect: Mood normal.         Behavior: Behavior normal.                 Assessment/Plan:       1. Shoulder strain, left, initial encounter     2. Shoulder injury, initial encounter  DX-SHOULDER 2+ LEFT     Negative imaging.  Ibuprofen and icing.  She is advised to consider ortho if not improving in next 5-7 days.    "

## 2020-08-04 DIAGNOSIS — J45.40 MODERATE PERSISTENT ASTHMA WITHOUT COMPLICATION: ICD-10-CM

## 2020-08-04 NOTE — TELEPHONE ENCOUNTER
Received request via: Pharmacy    Was the patient seen in the last year in this department? Yes    Does the patient have an active prescription (recently filled or refills available) for medication(s) requested? Yes. Requesting 90 day supply per insurance.

## 2020-08-05 RX ORDER — BUDESONIDE AND FORMOTEROL FUMARATE DIHYDRATE 160; 4.5 UG/1; UG/1
1 AEROSOL RESPIRATORY (INHALATION) 2 TIMES DAILY
Qty: 1 G | Refills: 3 | Status: SHIPPED | OUTPATIENT
Start: 2020-08-05 | End: 2021-05-20 | Stop reason: SDUPTHER

## 2020-09-14 DIAGNOSIS — J45.40 MODERATE PERSISTENT ASTHMA WITHOUT COMPLICATION: ICD-10-CM

## 2020-09-14 RX ORDER — MONTELUKAST SODIUM 10 MG/1
TABLET ORAL
Qty: 90 TAB | Refills: 2 | Status: SHIPPED | OUTPATIENT
Start: 2020-09-14

## 2021-05-20 DIAGNOSIS — J45.40 MODERATE PERSISTENT ASTHMA WITHOUT COMPLICATION: ICD-10-CM

## 2021-05-20 NOTE — TELEPHONE ENCOUNTER
Received request via: Pharmacy- second request    Was the patient seen in the last year in this department? No   4/8/20  Does the patient have an active prescription (recently filled or refills available) for medication(s) requested? No

## 2021-05-21 RX ORDER — BUDESONIDE AND FORMOTEROL FUMARATE DIHYDRATE 160; 4.5 UG/1; UG/1
1 AEROSOL RESPIRATORY (INHALATION) 2 TIMES DAILY
Qty: 1 EACH | Refills: 0 | Status: SHIPPED | OUTPATIENT
Start: 2021-05-21

## 2021-05-21 NOTE — TELEPHONE ENCOUNTER
Refill done. Patient is due for annual appointment. Please have patient schedule.  JOSEPH Kelly.